# Patient Record
Sex: FEMALE | Race: WHITE | NOT HISPANIC OR LATINO
[De-identification: names, ages, dates, MRNs, and addresses within clinical notes are randomized per-mention and may not be internally consistent; named-entity substitution may affect disease eponyms.]

---

## 2017-01-06 ENCOUNTER — OTHER (OUTPATIENT)
Age: 67
End: 2017-01-06

## 2018-12-06 ENCOUNTER — APPOINTMENT (OUTPATIENT)
Dept: NEUROSURGERY | Facility: CLINIC | Age: 68
End: 2018-12-06
Payer: MEDICARE

## 2018-12-06 VITALS
TEMPERATURE: 98.5 F | WEIGHT: 203 LBS | DIASTOLIC BLOOD PRESSURE: 83 MMHG | SYSTOLIC BLOOD PRESSURE: 139 MMHG | HEART RATE: 76 BPM | BODY MASS INDEX: 32.62 KG/M2 | HEIGHT: 66 IN | OXYGEN SATURATION: 95 %

## 2018-12-06 DIAGNOSIS — Z78.9 OTHER SPECIFIED HEALTH STATUS: ICD-10-CM

## 2018-12-06 DIAGNOSIS — R26.81 UNSTEADINESS ON FEET: ICD-10-CM

## 2018-12-06 PROCEDURE — 99204 OFFICE O/P NEW MOD 45 MIN: CPT

## 2018-12-10 PROBLEM — R26.81 GAIT INSTABILITY: Status: ACTIVE | Noted: 2018-12-06

## 2018-12-10 RX ORDER — MULTIVIT-MIN/IRON/FOLIC ACID/K 18-400-25
TABLET ORAL
Refills: 0 | Status: ACTIVE | COMMUNITY

## 2018-12-29 ENCOUNTER — TRANSCRIPTION ENCOUNTER (OUTPATIENT)
Age: 68
End: 2018-12-29

## 2019-01-03 ENCOUNTER — APPOINTMENT (OUTPATIENT)
Dept: NEUROSURGERY | Facility: CLINIC | Age: 69
End: 2019-01-03
Payer: MEDICARE

## 2019-01-03 VITALS
HEART RATE: 77 BPM | BODY MASS INDEX: 32.62 KG/M2 | HEIGHT: 66 IN | OXYGEN SATURATION: 96 % | SYSTOLIC BLOOD PRESSURE: 139 MMHG | DIASTOLIC BLOOD PRESSURE: 80 MMHG | TEMPERATURE: 98.9 F | WEIGHT: 203 LBS

## 2019-01-03 PROCEDURE — 99213 OFFICE O/P EST LOW 20 MIN: CPT

## 2019-01-31 ENCOUNTER — APPOINTMENT (OUTPATIENT)
Dept: NEUROSURGERY | Facility: CLINIC | Age: 69
End: 2019-01-31
Payer: MEDICARE

## 2019-01-31 VITALS
BODY MASS INDEX: 32.62 KG/M2 | HEART RATE: 80 BPM | SYSTOLIC BLOOD PRESSURE: 136 MMHG | HEIGHT: 66 IN | TEMPERATURE: 99.7 F | WEIGHT: 203 LBS | DIASTOLIC BLOOD PRESSURE: 77 MMHG

## 2019-01-31 PROCEDURE — 99215 OFFICE O/P EST HI 40 MIN: CPT

## 2019-02-07 NOTE — REASON FOR VISIT
[Follow-Up: _____] : a [unfilled] follow-up visit [Spouse] : spouse [FreeTextEntry1] : Ms. Chacon presents for follow up visit and review of EMG results.  She continues to report severe lower back pain with radiation to RLE which started around 2/2018. She does not recall any injury or trauma at that time. No LLE pain. LBP > RLE pain. Pain intensity 8/10. 10/10 at its worse. Denies any saddle anesthesia, urinary or bowel dysfunction. She is ambulating independently but has intermittent bouts of unsteadiness. She has noticed progressive worsening of gait over the last few months. No falls. She states symptoms are aggravated by activities in general. Nothing in particular improves her pain. She is currently managing her pain with gabapentin, Valium and oxycodone prescribed by neurology. She has not had any recent physical therapy or pain management.\par  \par

## 2019-02-07 NOTE — ASSESSMENT
[FreeTextEntry1] : Ms. Alegre presents for follow-up and review of EMG.  There is no electrophysiologic evidence of radiculopathy.  However, the patient has persistent symptoms despite conservative measures.  Overall, she has 80% LBP and 20 percent LE pain.  Of the LE pain, 80% is on the right and 20% is on the left.  I have explained the risks, benefits, and alternatives of lumbar laminectomy and extension of fusion as follows:\par benefit: hopeful improvement in pain, hopeful prevention of progression of symptoms\par alternatives: no surgical intervention; continued conservative measures (PT, pain management)\par risks: bleeding, infection, CSF leak, failure of procedure or instrumentation, adjacent level degeneration, pseudoarthrosis, need for re-operation, DVT, PE, MI, PNA, UTI, difficulty/failure to intubate or extubate, seizure, stroke, coma, death, worsening pain, new or worsening numbness, tingling, weakness, paralysis, sensory changes, difficulty/inability to walk, sexual dysfunction, incontinence\par I called the patient by telephone after further review of imaging and discussion of the case.  Ms. Alegre plans to retry PT x 4-6 weeks.  She will likely opt to schedule surgical intervention should she have persistent or worsening symptoms despite another course of PT, and I would then plan to schedule her accordingly.

## 2019-02-07 NOTE — PHYSICAL EXAM
[General Appearance - Alert] : alert [General Appearance - In No Acute Distress] : in no acute distress [General Appearance - Well Nourished] : well nourished [General Appearance - Well Developed] : well developed [General Appearance - Well-Appearing] : healthy appearing [Longitudinal] : longitudinal [Clean] : clean [Dry] : dry [Well-Healed] : well-healed [Intact] : intact [No Drainage] : without drainage [Normal Skin] : normal [Erythema] : not erythematous [Warm] : not warm [Fluctuant] : not fluctuant [Oriented To Time, Place, And Person] : oriented to person, place, and time [Person] : oriented to person [Place] : oriented to place [Time] : oriented to time [Short Term Intact] : short term memory intact [Concentration Intact] : normal concentrating ability [Fluency] : fluency intact [Cranial Nerves Optic (II)] : visual acuity intact bilaterally,  pupils equal round and reactive to light [Cranial Nerves Oculomotor (III)] : extraocular motion intact [Cranial Nerves Facial (VII)] : face symmetrical [Cranial Nerves Hypoglossal (XII)] : there was no tongue deviation with protrusion [Motor Tone] : muscle tone was normal in all four extremities [Motor Strength] : muscle strength was normal in all four extremities [Sensation Tactile Decrease] : light touch was intact [FreeTextEntry6] : LE 5/5 bilaterally [FreeTextEntry8] : antalgic gait

## 2019-04-09 ENCOUNTER — TRANSCRIPTION ENCOUNTER (OUTPATIENT)
Age: 69
End: 2019-04-09

## 2019-04-15 ENCOUNTER — APPOINTMENT (OUTPATIENT)
Dept: NEUROSURGERY | Facility: CLINIC | Age: 69
End: 2019-04-15
Payer: MEDICARE

## 2019-04-15 VITALS
WEIGHT: 200 LBS | SYSTOLIC BLOOD PRESSURE: 138 MMHG | BODY MASS INDEX: 32.14 KG/M2 | HEART RATE: 74 BPM | DIASTOLIC BLOOD PRESSURE: 77 MMHG | HEIGHT: 66 IN | TEMPERATURE: 98.9 F

## 2019-04-15 PROCEDURE — 99215 OFFICE O/P EST HI 40 MIN: CPT | Mod: 57

## 2019-04-18 NOTE — REVIEW OF SYSTEMS
[As Noted in HPI] : as noted in HPI [Negative] : Heme/Lymph [FreeTextEntry3] : periorbital ecchymosis s/p fall

## 2019-04-18 NOTE — PHYSICAL EXAM
[General Appearance - Alert] : alert [General Appearance - In No Acute Distress] : in no acute distress [General Appearance - Well Nourished] : well nourished [General Appearance - Well Developed] : well developed [General Appearance - Well-Appearing] : healthy appearing [] : normal voice and communication [Oriented To Time, Place, And Person] : oriented to person, place, and time [Affect] : the affect was normal [Impaired Insight] : insight and judgment were intact [Mood] : the mood was normal [Memory Recent] : recent memory was not impaired [Memory Remote] : remote memory was not impaired [Person] : oriented to person [Place] : oriented to place [Time] : oriented to time [Concentration Intact] : normal concentrating ability [Fluency] : fluency intact [Comprehension] : comprehension intact [Cranial Nerves Trigeminal (V)] : facial sensation intact symmetrically [Cranial Nerves Oculomotor (III)] : extraocular motion intact [Cranial Nerves Glossopharyngeal (IX)] : tongue and palate midline [Cranial Nerves Accessory (XI - Cranial And Spinal)] : head turning and shoulder shrug symmetric [Cranial Nerves Hypoglossal (XII)] : there was no tongue deviation with protrusion [Motor Tone] : muscle tone was normal in all four extremities [Balance] : balance was intact [Over the Past 2 Weeks, Have You Felt Down, Depressed, or Hopeless?] : 1.) Over the past 2 weeks, have you felt down, depressed, or hopeless? No [Over the Past 2 Weeks, Have You Felt Little Interest or Pleasure Doing Things?] : 2.) Over the past 2 weeks, have you felt little interest or pleasure doing things? No [Cranial Nerves Optic (II)] : visual acuity intact bilaterally,  pupils equal round and reactive to light [FreeTextEntry8] : antalgic gait [FreeTextEntry6] : LLE 5/5 with encouragement\par RLE 4-4+/5 with encouragement/limited secondary to pain

## 2019-04-18 NOTE — REASON FOR VISIT
[Follow-Up: _____] : a [unfilled] follow-up visit [Spouse] : spouse [FreeTextEntry1] : Ms. Alegre presents for follow-up and discussion of surgical intervention.  She has continued complaint of back greater than LE pain.  Of the lower extremity discomfort, the pain is worse on the right than the left.  The pain primarily affects the lateral aspect of the thigh to the knee.  It does also travel below the knee and is associated with numbness in the foot.  At today's visit, the patient endorses 2 months of difficulty with urination, which she states she did no mention during previous visits.  The patient sustained a fall recently secondary to her right leg giving out from pain.  She states that she fell into a fall but denies LOC, headaches, nausea, vomiting, or seizure.  She presents with ecchymosis under the eyes bilaterally at the time of today's visit.

## 2019-04-18 NOTE — ASSESSMENT
[FreeTextEntry1] : Ms. Alegre presents with her   for follow-up and discussion of surgical intervention.  She has an interval history as above and has failed conservative measures (PT and pain management).  I have explained to the patient that surgery is unlikely to significantly improve her chronic back pain.  She understands that the hopeful goal of surgery is relief of leg pain/radiculopathy.  I have explained the risks, benefits, and alternatives of surgical intervention to the patient and her  as below:\par benefit: hopeful decompression, hopeful pain relief/improvement in symptoms, hopeful prevention of progression of deficit\par alternative: no surgical intervention, continued conservative measures (PT, pain management)\par risks: bleeding, infection, CSF leak, failure of procedure or instrumentation, pseudoarthrosis, adjacent level degeneration, need for re-operation, seizure, stroke, coma, death, DVT, PE, MI, PNA, UTI, difficulty/failure to intubate or extubate, new or worsening numbness, tingling, weakness, paralysis, sensory changes, difficulty/inability to ambulate, sexual dysfunction, incontinence\par The patient and her  verbalize their understanding of the above.  I have answered all their questions.  Ms. Alegre wishes to proceed with surgical intervention.  She is pending a planned vacation and does not wish to proceed with surgical intervention on 4/22.  I will work to schedule her for Wednesday, 5/8.  The patient will, of course, require medical optimization.  Ms. Alegre and her  know to call the office with any new or concerning symptoms in the interim.

## 2019-04-29 ENCOUNTER — OUTPATIENT (OUTPATIENT)
Dept: OUTPATIENT SERVICES | Facility: HOSPITAL | Age: 69
LOS: 1 days | End: 2019-04-29
Payer: MEDICARE

## 2019-04-29 VITALS
TEMPERATURE: 99 F | WEIGHT: 200.62 LBS | HEIGHT: 65 IN | RESPIRATION RATE: 16 BRPM | HEART RATE: 72 BPM | DIASTOLIC BLOOD PRESSURE: 91 MMHG | SYSTOLIC BLOOD PRESSURE: 143 MMHG

## 2019-04-29 DIAGNOSIS — Z90.49 ACQUIRED ABSENCE OF OTHER SPECIFIED PARTS OF DIGESTIVE TRACT: Chronic | ICD-10-CM

## 2019-04-29 DIAGNOSIS — Z29.9 ENCOUNTER FOR PROPHYLACTIC MEASURES, UNSPECIFIED: ICD-10-CM

## 2019-04-29 DIAGNOSIS — Z98.89 OTHER SPECIFIED POSTPROCEDURAL STATES: Chronic | ICD-10-CM

## 2019-04-29 DIAGNOSIS — M48.061 SPINAL STENOSIS, LUMBAR REGION WITHOUT NEUROGENIC CLAUDICATION: ICD-10-CM

## 2019-04-29 DIAGNOSIS — Z01.818 ENCOUNTER FOR OTHER PREPROCEDURAL EXAMINATION: ICD-10-CM

## 2019-04-29 LAB
ALBUMIN SERPL ELPH-MCNC: 4.4 G/DL — SIGNIFICANT CHANGE UP (ref 3.3–5.2)
ALP SERPL-CCNC: 95 U/L — SIGNIFICANT CHANGE UP (ref 40–120)
ALT FLD-CCNC: 25 U/L — SIGNIFICANT CHANGE UP
ANION GAP SERPL CALC-SCNC: 15 MMOL/L — SIGNIFICANT CHANGE UP (ref 5–17)
APTT BLD: 29 SEC — SIGNIFICANT CHANGE UP (ref 27.5–36.3)
AST SERPL-CCNC: 27 U/L — SIGNIFICANT CHANGE UP
BASOPHILS # BLD AUTO: 0 K/UL — SIGNIFICANT CHANGE UP (ref 0–0.2)
BASOPHILS NFR BLD AUTO: 0.5 % — SIGNIFICANT CHANGE UP (ref 0–2)
BILIRUB SERPL-MCNC: 0.3 MG/DL — LOW (ref 0.4–2)
BLD GP AB SCN SERPL QL: SIGNIFICANT CHANGE UP
BUN SERPL-MCNC: 6 MG/DL — LOW (ref 8–20)
CALCIUM SERPL-MCNC: 9.1 MG/DL — SIGNIFICANT CHANGE UP (ref 8.6–10.2)
CHLORIDE SERPL-SCNC: 110 MMOL/L — HIGH (ref 98–107)
CO2 SERPL-SCNC: 22 MMOL/L — SIGNIFICANT CHANGE UP (ref 22–29)
CREAT SERPL-MCNC: 0.71 MG/DL — SIGNIFICANT CHANGE UP (ref 0.5–1.3)
EOSINOPHIL # BLD AUTO: 0.2 K/UL — SIGNIFICANT CHANGE UP (ref 0–0.5)
EOSINOPHIL NFR BLD AUTO: 1.7 % — SIGNIFICANT CHANGE UP (ref 0–6)
GLUCOSE SERPL-MCNC: 110 MG/DL — SIGNIFICANT CHANGE UP (ref 70–115)
HBA1C BLD-MCNC: 6 % — HIGH (ref 4–5.6)
HCT VFR BLD CALC: 45.6 % — SIGNIFICANT CHANGE UP (ref 37–47)
HGB BLD-MCNC: 14.8 G/DL — SIGNIFICANT CHANGE UP (ref 12–16)
INR BLD: 0.9 RATIO — SIGNIFICANT CHANGE UP (ref 0.88–1.16)
LYMPHOCYTES # BLD AUTO: 3.5 K/UL — SIGNIFICANT CHANGE UP (ref 1–4.8)
LYMPHOCYTES # BLD AUTO: 39.4 % — SIGNIFICANT CHANGE UP (ref 20–55)
MCHC RBC-ENTMCNC: 28.1 PG — SIGNIFICANT CHANGE UP (ref 27–31)
MCHC RBC-ENTMCNC: 32.5 G/DL — SIGNIFICANT CHANGE UP (ref 32–36)
MCV RBC AUTO: 86.7 FL — SIGNIFICANT CHANGE UP (ref 81–99)
MONOCYTES # BLD AUTO: 0.5 K/UL — SIGNIFICANT CHANGE UP (ref 0–0.8)
MONOCYTES NFR BLD AUTO: 5.9 % — SIGNIFICANT CHANGE UP (ref 3–10)
MRSA PCR RESULT.: SIGNIFICANT CHANGE UP
NEUTROPHILS # BLD AUTO: 4.6 K/UL — SIGNIFICANT CHANGE UP (ref 1.8–8)
NEUTROPHILS NFR BLD AUTO: 52.4 % — SIGNIFICANT CHANGE UP (ref 37–73)
PLATELET # BLD AUTO: 261 K/UL — SIGNIFICANT CHANGE UP (ref 150–400)
POTASSIUM SERPL-MCNC: 3.7 MMOL/L — SIGNIFICANT CHANGE UP (ref 3.5–5.3)
POTASSIUM SERPL-SCNC: 3.7 MMOL/L — SIGNIFICANT CHANGE UP (ref 3.5–5.3)
PROT SERPL-MCNC: 7.3 G/DL — SIGNIFICANT CHANGE UP (ref 6.6–8.7)
PROTHROM AB SERPL-ACNC: 10.3 SEC — SIGNIFICANT CHANGE UP (ref 10–12.9)
RBC # BLD: 5.26 M/UL — HIGH (ref 4.4–5.2)
RBC # FLD: 14.5 % — SIGNIFICANT CHANGE UP (ref 11–15.6)
S AUREUS DNA NOSE QL NAA+PROBE: SIGNIFICANT CHANGE UP
SODIUM SERPL-SCNC: 147 MMOL/L — HIGH (ref 135–145)
TYPE + AB SCN PNL BLD: SIGNIFICANT CHANGE UP
WBC # BLD: 8.8 K/UL — SIGNIFICANT CHANGE UP (ref 4.8–10.8)
WBC # FLD AUTO: 8.8 K/UL — SIGNIFICANT CHANGE UP (ref 4.8–10.8)

## 2019-04-29 PROCEDURE — 86901 BLOOD TYPING SEROLOGIC RH(D): CPT

## 2019-04-29 PROCEDURE — 85027 COMPLETE CBC AUTOMATED: CPT

## 2019-04-29 PROCEDURE — G0463: CPT

## 2019-04-29 PROCEDURE — 87641 MR-STAPH DNA AMP PROBE: CPT

## 2019-04-29 PROCEDURE — 85610 PROTHROMBIN TIME: CPT

## 2019-04-29 PROCEDURE — 85730 THROMBOPLASTIN TIME PARTIAL: CPT

## 2019-04-29 PROCEDURE — 36415 COLL VENOUS BLD VENIPUNCTURE: CPT

## 2019-04-29 PROCEDURE — 87640 STAPH A DNA AMP PROBE: CPT

## 2019-04-29 PROCEDURE — 93010 ELECTROCARDIOGRAM REPORT: CPT

## 2019-04-29 PROCEDURE — 86900 BLOOD TYPING SEROLOGIC ABO: CPT

## 2019-04-29 PROCEDURE — 86850 RBC ANTIBODY SCREEN: CPT

## 2019-04-29 PROCEDURE — 83036 HEMOGLOBIN GLYCOSYLATED A1C: CPT

## 2019-04-29 PROCEDURE — 93005 ELECTROCARDIOGRAM TRACING: CPT

## 2019-04-29 PROCEDURE — 80053 COMPREHEN METABOLIC PANEL: CPT

## 2019-04-29 RX ORDER — SODIUM CHLORIDE 9 MG/ML
3 INJECTION INTRAMUSCULAR; INTRAVENOUS; SUBCUTANEOUS EVERY 8 HOURS
Qty: 0 | Refills: 0 | Status: DISCONTINUED | OUTPATIENT
Start: 2019-05-08 | End: 2019-05-08

## 2019-04-29 NOTE — H&P PST ADULT - NSICDXPASTSURGICALHX_GEN_ALL_CORE_FT
PAST SURGICAL HISTORY:  S/P cholecystectomy     S/P colon resection     S/P lumbar fusion 2000's,  cervical discectomy 1980's,  rotator cuff repair 1980's,  ovarian cystectomy 1980's,  arthroscopy right knee x2, left once in the 2000's,  left lumpectomy 1980's (benign)    S/P tonsillectomy 1970's

## 2019-04-29 NOTE — H&P PST ADULT - NSANTHOSAYNRD_GEN_A_CORE
No. ALLY screening performed.  STOP BANG Legend: 0-2 = LOW Risk; 3-4 = INTERMEDIATE Risk; 5-8 = HIGH Risk

## 2019-04-29 NOTE — H&P PST ADULT - NSICDXPASTMEDICALHX_GEN_ALL_CORE_FT
PAST MEDICAL HISTORY:  Chronic pain     Colon cancer     GERD (gastroesophageal reflux disease)     Hyperlipidemia     Hypothyroid     IBS (irritable bowel syndrome)     Mariola-Cai tear repaired 5/14/15    Migraine     Raynaud disease

## 2019-04-29 NOTE — H&P PST ADULT - NSICDXPROBLEM_GEN_ALL_CORE_FT
PROBLEM DIAGNOSES  Problem: Lumbar stenosis  Assessment and Plan: L2-3, L3-4 laminectomy and fusion.  Preop medical eval    Problem: Need for prophylactic measure  Assessment and Plan: Caprini score 4, mod VTE risk, SCDs ordered, surgical team to assess need for pharm proph.

## 2019-04-29 NOTE — PATIENT PROFILE ADULT - NSPROEDALEARNPREF_GEN_A_NUR
verbal instruction/individual instruction/written material/Pre op teaching surgical scrub pain management instructions given to pt

## 2019-04-29 NOTE — H&P PST ADULT - ASSESSMENT
68 yo female with L2-3, L3-4 stenosis.    CAPRINI VTE 2.0 SCORE [CLOT updated 2019]    AGE RELATED RISK FACTORS                                                       MOBILITY RELATED FACTORS  [ ] Age 41-60 years                                            (1 Point)                    [ ] Bed rest                                                        (1 Point)  [ x ] Age: 61-74 years                                           (2 Points)                  [ ] Plaster cast                                                   (2 Points)  [ ] Age= 75 years                                              (3 Points)                    [ ] Bed bound for more than 72 hours                 (2 Points)    DISEASE RELATED RISK FACTORS                                               GENDER SPECIFIC FACTORS  [ ] Edema in the lower extremities                       (1 Point)              [ ] Pregnancy                                                     (1 Point)  [ ] Varicose veins                                               (1 Point)                     [ ] Post-partum < 6 weeks                                   (1 Point)             [ x ] BMI > 25 Kg/m2                                            (1 Point)                     [ ] Hormonal therapy  or oral contraception          (1 Point)                 [ ] Sepsis (in the previous month)                        (1 Point)               [ ] History of pregnancy complications                 (1 point)  [ ] Pneumonia or serious lung disease                                               [ ] Unexplained or recurrent                     (1 Point)           (in the previous month)                               (1 Point)  [ ] Abnormal pulmonary function test                     (1 Point)                 SURGERY RELATED RISK FACTORS  [ ] Acute myocardial infarction                              (1 Point)               [ ]  Section                                             (1 Point)  [ ] Congestive heart failure (in the previous month)  (1 Point)      [ ] Minor surgery                                                  (1 Point)   [ ] Inflammatory bowel disease                             (1 Point)               [ ] Arthroscopic surgery                                        (2 Points)  [ ] Central venous access                                      (2 Points)                [ x ] General surgery lasting more than 45 minutes (2 points)  [ ] Malignancy- Present or previous                   (2 Points)                [ ] Elective arthroplasty                                         (5 points)    [ ] Stroke (in the previous month)                          (5 Points)                                                                                                                                                           HEMATOLOGY RELATED FACTORS                                                 TRAUMA RELATED RISK FACTORS  [ ] Prior episodes of VTE                                     (3 Points)                [ ] Fracture of the hip, pelvis, or leg                       (5 Points)  [ ] Positive family history for VTE                         (3 Points)             [ ] Acute spinal cord injury (in the previous month)  (5 Points)  [ ] Prothrombin 84264 A                                     (3 Points)               [ ] Paralysis  (less than 1 month)                             (5 Points)  [ ] Factor V Leiden                                             (3 Points)                  [ ] Multiple Trauma within 1 month                        (5 Points)  [ ] Lupus anticoagulants                                     (3 Points)                                                           [ ] Anticardiolipin antibodies                               (3 Points)                                                       [ ] High homocysteine in the blood                      (3 Points)                                             [ ] Other congenital or acquired thrombophilia      (3 Points)                                                [ ] Heparin induced thrombocytopenia                  (3 Points)                                     Total Score [    4     ]    OPIOID RISK TOOL    LIBRADO EACH BOX THAT APPLIES AND ADD TOTALS AT THE END    FAMILY HISTORY OF SUBSTANCE ABUSE                 FEMALE         MALE                                                Alcohol                             [  ]1 pt          [  ]3pts                                               Illegal Drugs                     [  ]2 pts        [  ]3pts                                               Rx Drugs                           [  ]4 pts        [  ]4 pts    PERSONAL HISTORY OF SUBSTANCE ABUSE                                                                                          Alcohol                             [  ]3 pts       [  ]3 pts                                               Illegal Drugs                     [  ]4 pts        [  ]4 pts                                               Rx Drugs                           [  ]5 pts        [  ]5 pts    AGE BETWEEN 16-45 YEARS                                      [  ]1 pt         [  ]1 pt    HISTORY OF PREADOLESCENT   SEXUAL ABUSE                                                             [  ]3 pts        [  ]0pts    PSYCHOLOGICAL DISEASE                     ADD, OCD, Bipolar, Schizophrenia        [  ]2 pts         [  ]2 pts                      Depression                                               [ x ]1 pt           [  ]1 pt           SCORING TOTAL   (add numbers and type here)              (*1*)                                     A score of 3 or lower indicated LOW risk for future opioid abuse  A score of 4 to 7 indicated moderate risk for future opioid abuse  A score of 8 or higher indicates a high risk for opioid abuse

## 2019-05-07 ENCOUNTER — TRANSCRIPTION ENCOUNTER (OUTPATIENT)
Age: 69
End: 2019-05-07

## 2019-05-08 ENCOUNTER — APPOINTMENT (OUTPATIENT)
Dept: NEUROSURGERY | Facility: HOSPITAL | Age: 69
End: 2019-05-08
Payer: MEDICARE

## 2019-05-08 ENCOUNTER — INPATIENT (INPATIENT)
Facility: HOSPITAL | Age: 69
LOS: 5 days | Discharge: HOME | DRG: 460 | End: 2019-05-14
Attending: NEUROLOGICAL SURGERY | Admitting: NEUROLOGICAL SURGERY
Payer: MEDICARE

## 2019-05-08 VITALS
TEMPERATURE: 97 F | OXYGEN SATURATION: 100 % | DIASTOLIC BLOOD PRESSURE: 84 MMHG | HEIGHT: 65 IN | WEIGHT: 200.62 LBS | RESPIRATION RATE: 16 BRPM | HEART RATE: 79 BPM | SYSTOLIC BLOOD PRESSURE: 146 MMHG

## 2019-05-08 DIAGNOSIS — M48.061 SPINAL STENOSIS, LUMBAR REGION WITHOUT NEUROGENIC CLAUDICATION: ICD-10-CM

## 2019-05-08 DIAGNOSIS — Z90.49 ACQUIRED ABSENCE OF OTHER SPECIFIED PARTS OF DIGESTIVE TRACT: Chronic | ICD-10-CM

## 2019-05-08 DIAGNOSIS — M47.816 SPONDYLOSIS WITHOUT MYELOPATHY OR RADICULOPATHY, LUMBAR REGION: ICD-10-CM

## 2019-05-08 DIAGNOSIS — Z98.89 OTHER SPECIFIED POSTPROCEDURAL STATES: Chronic | ICD-10-CM

## 2019-05-08 LAB
GRAM STN FLD: SIGNIFICANT CHANGE UP
GRAM STN FLD: SIGNIFICANT CHANGE UP
SPECIMEN SOURCE: SIGNIFICANT CHANGE UP
SPECIMEN SOURCE: SIGNIFICANT CHANGE UP

## 2019-05-08 PROCEDURE — 63048 LAM FACETEC &FORAMOT EA ADDL: CPT | Mod: 62

## 2019-05-08 PROCEDURE — 22830 EXPLORATION OF SPINAL FUSION: CPT | Mod: 62,59

## 2019-05-08 PROCEDURE — 63047 LAM FACETEC & FORAMOT LUMBAR: CPT | Mod: 62

## 2019-05-08 PROCEDURE — 22614 ARTHRD PST TQ 1NTRSPC EA ADD: CPT | Mod: 62

## 2019-05-08 PROCEDURE — 63048 LAM FACETEC &FORAMOT EA ADDL: CPT

## 2019-05-08 PROCEDURE — 22612 ARTHRD PST TQ 1NTRSPC LUMBAR: CPT | Mod: 62

## 2019-05-08 PROCEDURE — 63042 LAMINOTOMY SINGLE LUMBAR: CPT | Mod: 59,62

## 2019-05-08 PROCEDURE — 22842 INSERT SPINE FIXATION DEVICE: CPT

## 2019-05-08 PROCEDURE — 22842 INSERT SPINE FIXATION DEVICE: CPT | Mod: 62

## 2019-05-08 RX ORDER — RALOXIFENE HYDROCHLORIDE 60 MG/1
60 TABLET, COATED ORAL DAILY
Qty: 0 | Refills: 0 | Status: DISCONTINUED | OUTPATIENT
Start: 2019-05-08 | End: 2019-05-14

## 2019-05-08 RX ORDER — VENLAFAXINE HCL 75 MG
225 CAPSULE, EXT RELEASE 24 HR ORAL DAILY
Qty: 0 | Refills: 0 | Status: DISCONTINUED | OUTPATIENT
Start: 2019-05-08 | End: 2019-05-08

## 2019-05-08 RX ORDER — FAMOTIDINE 10 MG/ML
1 INJECTION INTRAVENOUS
Qty: 0 | Refills: 0 | COMMUNITY

## 2019-05-08 RX ORDER — VANCOMYCIN HCL 1 G
1500 VIAL (EA) INTRAVENOUS ONCE
Qty: 0 | Refills: 0 | Status: COMPLETED | OUTPATIENT
Start: 2019-05-08 | End: 2019-05-08

## 2019-05-08 RX ORDER — LIDOCAINE 4 G/100G
1 CREAM TOPICAL
Qty: 0 | Refills: 0 | COMMUNITY

## 2019-05-08 RX ORDER — ACETAMINOPHEN 500 MG
650 TABLET ORAL EVERY 6 HOURS
Qty: 0 | Refills: 0 | Status: DISCONTINUED | OUTPATIENT
Start: 2019-05-08 | End: 2019-05-11

## 2019-05-08 RX ORDER — GABAPENTIN 400 MG/1
0 CAPSULE ORAL
Qty: 0 | Refills: 0 | COMMUNITY

## 2019-05-08 RX ORDER — RALOXIFENE HYDROCHLORIDE 60 MG/1
1 TABLET, COATED ORAL
Qty: 0 | Refills: 0 | COMMUNITY

## 2019-05-08 RX ORDER — VERAPAMIL HCL 240 MG
240 CAPSULE, EXTENDED RELEASE PELLETS 24 HR ORAL DAILY
Qty: 0 | Refills: 0 | Status: DISCONTINUED | OUTPATIENT
Start: 2019-05-08 | End: 2019-05-14

## 2019-05-08 RX ORDER — SODIUM CHLORIDE 9 MG/ML
3 INJECTION INTRAMUSCULAR; INTRAVENOUS; SUBCUTANEOUS EVERY 8 HOURS
Qty: 0 | Refills: 0 | Status: DISCONTINUED | OUTPATIENT
Start: 2019-05-08 | End: 2019-05-14

## 2019-05-08 RX ORDER — ONDANSETRON 8 MG/1
4 TABLET, FILM COATED ORAL EVERY 6 HOURS
Qty: 0 | Refills: 0 | Status: DISCONTINUED | OUTPATIENT
Start: 2019-05-08 | End: 2019-05-12

## 2019-05-08 RX ORDER — ZOLMITRIPTAN 5 MG/1
5 TABLET ORAL DAILY
Qty: 0 | Refills: 0 | Status: DISCONTINUED | OUTPATIENT
Start: 2019-05-08 | End: 2019-05-14

## 2019-05-08 RX ORDER — WHEAT DEXTRIN 3 G/4 G
0 POWDER IN PACKET (EA) ORAL
Qty: 0 | Refills: 0 | COMMUNITY

## 2019-05-08 RX ORDER — SENNA PLUS 8.6 MG/1
2 TABLET ORAL AT BEDTIME
Qty: 0 | Refills: 0 | Status: DISCONTINUED | OUTPATIENT
Start: 2019-05-08 | End: 2019-05-12

## 2019-05-08 RX ORDER — FAMOTIDINE 10 MG/ML
20 INJECTION INTRAVENOUS
Qty: 0 | Refills: 0 | Status: DISCONTINUED | OUTPATIENT
Start: 2019-05-08 | End: 2019-05-14

## 2019-05-08 RX ORDER — ATORVASTATIN CALCIUM 80 MG/1
20 TABLET, FILM COATED ORAL AT BEDTIME
Qty: 0 | Refills: 0 | Status: DISCONTINUED | OUTPATIENT
Start: 2019-05-08 | End: 2019-05-14

## 2019-05-08 RX ORDER — FENTANYL CITRATE 50 UG/ML
50 INJECTION INTRAVENOUS
Qty: 0 | Refills: 0 | Status: DISCONTINUED | OUTPATIENT
Start: 2019-05-08 | End: 2019-05-08

## 2019-05-08 RX ORDER — CYCLOBENZAPRINE HYDROCHLORIDE 10 MG/1
10 TABLET, FILM COATED ORAL EVERY 8 HOURS
Qty: 0 | Refills: 0 | Status: DISCONTINUED | OUTPATIENT
Start: 2019-05-08 | End: 2019-05-11

## 2019-05-08 RX ORDER — ONDANSETRON 8 MG/1
4 TABLET, FILM COATED ORAL EVERY 4 HOURS
Qty: 0 | Refills: 0 | Status: DISCONTINUED | OUTPATIENT
Start: 2019-05-08 | End: 2019-05-14

## 2019-05-08 RX ORDER — NALOXONE HYDROCHLORIDE 4 MG/.1ML
0.1 SPRAY NASAL
Qty: 0 | Refills: 0 | Status: DISCONTINUED | OUTPATIENT
Start: 2019-05-08 | End: 2019-05-12

## 2019-05-08 RX ORDER — GABAPENTIN 400 MG/1
600 CAPSULE ORAL THREE TIMES A DAY
Qty: 0 | Refills: 0 | Status: DISCONTINUED | OUTPATIENT
Start: 2019-05-08 | End: 2019-05-14

## 2019-05-08 RX ORDER — HYDROMORPHONE HYDROCHLORIDE 2 MG/ML
30 INJECTION INTRAMUSCULAR; INTRAVENOUS; SUBCUTANEOUS
Qty: 0 | Refills: 0 | Status: DISCONTINUED | OUTPATIENT
Start: 2019-05-08 | End: 2019-05-10

## 2019-05-08 RX ORDER — ZOLMITRIPTAN 5 MG/1
1 TABLET ORAL
Qty: 0 | Refills: 0 | COMMUNITY

## 2019-05-08 RX ORDER — SODIUM CHLORIDE 9 MG/ML
1000 INJECTION INTRAMUSCULAR; INTRAVENOUS; SUBCUTANEOUS
Qty: 0 | Refills: 0 | Status: DISCONTINUED | OUTPATIENT
Start: 2019-05-08 | End: 2019-05-09

## 2019-05-08 RX ORDER — VENLAFAXINE HCL 75 MG
25 CAPSULE, EXT RELEASE 24 HR ORAL DAILY
Qty: 0 | Refills: 0 | Status: DISCONTINUED | OUTPATIENT
Start: 2019-05-08 | End: 2019-05-08

## 2019-05-08 RX ORDER — TOPIRAMATE 25 MG
50 TABLET ORAL
Qty: 0 | Refills: 0 | Status: DISCONTINUED | OUTPATIENT
Start: 2019-05-08 | End: 2019-05-14

## 2019-05-08 RX ORDER — FLUCONAZOLE 150 MG/1
1 TABLET ORAL
Qty: 0 | Refills: 0 | COMMUNITY

## 2019-05-08 RX ORDER — DIAZEPAM 5 MG
5 TABLET ORAL
Qty: 0 | Refills: 0 | Status: DISCONTINUED | OUTPATIENT
Start: 2019-05-08 | End: 2019-05-09

## 2019-05-08 RX ORDER — CHOLECALCIFEROL (VITAMIN D3) 125 MCG
2000 CAPSULE ORAL DAILY
Qty: 0 | Refills: 0 | Status: DISCONTINUED | OUTPATIENT
Start: 2019-05-08 | End: 2019-05-14

## 2019-05-08 RX ORDER — LIDOCAINE 4 G/100G
1 CREAM TOPICAL DAILY
Qty: 0 | Refills: 0 | Status: DISCONTINUED | OUTPATIENT
Start: 2019-05-08 | End: 2019-05-14

## 2019-05-08 RX ORDER — SODIUM CHLORIDE 9 MG/ML
1000 INJECTION, SOLUTION INTRAVENOUS
Qty: 0 | Refills: 0 | Status: DISCONTINUED | OUTPATIENT
Start: 2019-05-08 | End: 2019-05-08

## 2019-05-08 RX ORDER — LEVOTHYROXINE SODIUM 125 MCG
1 TABLET ORAL
Qty: 0 | Refills: 0 | COMMUNITY

## 2019-05-08 RX ORDER — CEFAZOLIN SODIUM 1 G
2000 VIAL (EA) INJECTION ONCE
Qty: 0 | Refills: 0 | Status: DISCONTINUED | OUTPATIENT
Start: 2019-05-08 | End: 2019-05-08

## 2019-05-08 RX ORDER — POLYETHYLENE GLYCOL 3350 17 G/17G
17 POWDER, FOR SOLUTION ORAL
Qty: 0 | Refills: 0 | COMMUNITY

## 2019-05-08 RX ORDER — GABAPENTIN 400 MG/1
600 CAPSULE ORAL AT BEDTIME
Qty: 0 | Refills: 0 | Status: DISCONTINUED | OUTPATIENT
Start: 2019-05-08 | End: 2019-05-08

## 2019-05-08 RX ORDER — VERAPAMIL HCL 240 MG
240 CAPSULE, EXTENDED RELEASE PELLETS 24 HR ORAL DAILY
Qty: 0 | Refills: 0 | Status: DISCONTINUED | OUTPATIENT
Start: 2019-05-08 | End: 2019-05-08

## 2019-05-08 RX ORDER — MAGNESIUM HYDROXIDE 400 MG/1
30 TABLET, CHEWABLE ORAL EVERY 12 HOURS
Qty: 0 | Refills: 0 | Status: DISCONTINUED | OUTPATIENT
Start: 2019-05-08 | End: 2019-05-12

## 2019-05-08 RX ORDER — FLUCONAZOLE 150 MG/1
100 TABLET ORAL DAILY
Qty: 0 | Refills: 0 | Status: DISCONTINUED | OUTPATIENT
Start: 2019-05-08 | End: 2019-05-08

## 2019-05-08 RX ORDER — ONDANSETRON 8 MG/1
4 TABLET, FILM COATED ORAL ONCE
Qty: 0 | Refills: 0 | Status: DISCONTINUED | OUTPATIENT
Start: 2019-05-08 | End: 2019-05-08

## 2019-05-08 RX ORDER — FLUOCINONIDE/EMOLLIENT BASE 0.05 %
1 CREAM (GRAM) TOPICAL
Qty: 0 | Refills: 0 | COMMUNITY

## 2019-05-08 RX ORDER — VENLAFAXINE HCL 75 MG
1 CAPSULE, EXT RELEASE 24 HR ORAL
Qty: 0 | Refills: 0 | COMMUNITY

## 2019-05-08 RX ORDER — VENLAFAXINE HCL 75 MG
225 CAPSULE, EXT RELEASE 24 HR ORAL DAILY
Qty: 0 | Refills: 0 | Status: DISCONTINUED | OUTPATIENT
Start: 2019-05-08 | End: 2019-05-14

## 2019-05-08 RX ORDER — LEVOTHYROXINE SODIUM 125 MCG
75 TABLET ORAL DAILY
Qty: 0 | Refills: 0 | Status: DISCONTINUED | OUTPATIENT
Start: 2019-05-08 | End: 2019-05-14

## 2019-05-08 RX ORDER — ALPRAZOLAM 0.25 MG
0.5 TABLET ORAL THREE TIMES A DAY
Qty: 0 | Refills: 0 | Status: DISCONTINUED | OUTPATIENT
Start: 2019-05-08 | End: 2019-05-14

## 2019-05-08 RX ORDER — DOCUSATE SODIUM 100 MG
100 CAPSULE ORAL THREE TIMES A DAY
Qty: 0 | Refills: 0 | Status: DISCONTINUED | OUTPATIENT
Start: 2019-05-08 | End: 2019-05-12

## 2019-05-08 RX ORDER — VERAPAMIL HCL 240 MG
1 CAPSULE, EXTENDED RELEASE PELLETS 24 HR ORAL
Qty: 0 | Refills: 0 | COMMUNITY

## 2019-05-08 RX ORDER — ROSUVASTATIN CALCIUM 5 MG/1
1 TABLET ORAL
Qty: 0 | Refills: 0 | COMMUNITY

## 2019-05-08 RX ORDER — ALPRAZOLAM 0.25 MG
1 TABLET ORAL
Qty: 0 | Refills: 0 | COMMUNITY

## 2019-05-08 RX ORDER — CHOLECALCIFEROL (VITAMIN D3) 125 MCG
1 CAPSULE ORAL
Qty: 0 | Refills: 0 | COMMUNITY

## 2019-05-08 RX ADMIN — Medication 300 MILLIGRAM(S): at 22:15

## 2019-05-08 RX ADMIN — HYDROMORPHONE HYDROCHLORIDE 30 MILLILITER(S): 2 INJECTION INTRAMUSCULAR; INTRAVENOUS; SUBCUTANEOUS at 14:55

## 2019-05-08 RX ADMIN — HYDROMORPHONE HYDROCHLORIDE 30 MILLILITER(S): 2 INJECTION INTRAMUSCULAR; INTRAVENOUS; SUBCUTANEOUS at 19:25

## 2019-05-08 RX ADMIN — CYCLOBENZAPRINE HYDROCHLORIDE 10 MILLIGRAM(S): 10 TABLET, FILM COATED ORAL at 22:35

## 2019-05-08 RX ADMIN — Medication 100 MILLIGRAM(S): at 22:34

## 2019-05-08 RX ADMIN — GABAPENTIN 600 MILLIGRAM(S): 400 CAPSULE ORAL at 22:34

## 2019-05-08 RX ADMIN — SODIUM CHLORIDE 3 MILLILITER(S): 9 INJECTION INTRAMUSCULAR; INTRAVENOUS; SUBCUTANEOUS at 21:54

## 2019-05-08 RX ADMIN — SENNA PLUS 2 TABLET(S): 8.6 TABLET ORAL at 22:34

## 2019-05-08 RX ADMIN — Medication 50 MILLIGRAM(S): at 22:35

## 2019-05-08 RX ADMIN — ATORVASTATIN CALCIUM 20 MILLIGRAM(S): 80 TABLET, FILM COATED ORAL at 22:35

## 2019-05-08 RX ADMIN — HYDROMORPHONE HYDROCHLORIDE 30 MILLILITER(S): 2 INJECTION INTRAMUSCULAR; INTRAVENOUS; SUBCUTANEOUS at 20:03

## 2019-05-08 NOTE — BRIEF OPERATIVE NOTE - NSICDXBRIEFPROCEDURE_GEN_ALL_CORE_FT
PROCEDURES:  Lumbar fusion 08-May-2019 14:10:13  Abner Horne
PROCEDURES:  Lumbar fusion 08-May-2019 14:10:13  Abner Horne

## 2019-05-08 NOTE — BRIEF OPERATIVE NOTE - OPERATION/FINDINGS
severe stenosis L3 L4
+ decompression of thecal sac and nerve roots; adherent dura at L3-4 level with intra-operative CSF leak

## 2019-05-08 NOTE — BRIEF OPERATIVE NOTE - NSICDXBRIEFPOSTOP_GEN_ALL_CORE_FT
POST-OP DIAGNOSIS:  Lumbar stenosis with neurogenic claudication 08-May-2019 14:11:17  Abner Horne
POST-OP DIAGNOSIS:  Lumbar stenosis with neurogenic claudication 08-May-2019 14:11:17  Abner Horne

## 2019-05-08 NOTE — PROGRESS NOTE ADULT - ASSESSMENT
Pt s/p posterior lumbar L3/4 L3/4 laminectomy and hardware fusion extension  POD# 0  pt seen in PACU/ flat in bed and states is at baseline, denied any new or worsening sensorimotor changes  + GOOD drain to bulb suction     neuro-checks q4 HR and monitored bed/    CT CS w/o contrast in AM or Saturday as per Dr Adal kolbt w home meds   HOB flat/ log roll permitted   hold all AC/AP   SBP < 150mmHg  PCA/ jones/ IVF

## 2019-05-08 NOTE — BRIEF OPERATIVE NOTE - NSICDXBRIEFPREOP_GEN_ALL_CORE_FT
PRE-OP DIAGNOSIS:  Spinal stenosis, lumbar region with neurogenic claudication 08-May-2019 14:10:24  Abner Horne
PRE-OP DIAGNOSIS:  Spinal stenosis, lumbar region with neurogenic claudication 08-May-2019 14:10:24  Abner Horne
86

## 2019-05-08 NOTE — PROGRESS NOTE ADULT - SUBJECTIVE AND OBJECTIVE BOX
NEUROSURGERY POST-OP/PROGRESS NOTE:    Pt s/p posterior lumbar L3/4 L3/4 laminectomy and hardware fusion extension  POD# 0  pt seen in PACU/ flat in bed and states is at baseline, denied any new or worsening sensorimotor changes  + PCA   -headache   - Nausea / - Vomiting  denies new/ worsening weakness, some LLE weakness as pre-op  denies numbness/ + mild tingling to RLE tingling  denies visual changes  denies C/TS pain   +LS posterior/ incisional Spine pain   - BM  + bed rest   - diet/ NPO   + jones cath to gravity   + venodynes b/l when in bed   +GOOD drain to bulb suction     MEDICATIONS  (STANDING):  atorvastatin 20 milliGRAM(s) Oral at bedtime  calcium carbonate 1250 mG  + Vitamin D (OsCal 500 + D) 1 Tablet(s) Oral two times a day  cholecalciferol 2000 Unit(s) Oral daily  cyclobenzaprine 10 milliGRAM(s) Oral every 8 hours  docusate sodium 100 milliGRAM(s) Oral three times a day  famotidine    Tablet 20 milliGRAM(s) Oral two times a day  gabapentin 600 milliGRAM(s) Oral three times a day  HYDROmorphone PCA (1 mG/mL) 30 milliLiter(s) PCA Continuous PCA Continuous  lactated ringers. 1000 milliLiter(s) (125 mL/Hr) IV Continuous <Continuous>  levothyroxine 75 MICROGram(s) Oral daily  raloxifene 60 milliGRAM(s) Oral daily  senna 2 Tablet(s) Oral at bedtime  sodium chloride 0.9% lock flush 3 milliLiter(s) IV Push every 8 hours  sodium chloride 0.9%. 1000 milliLiter(s) (100 mL/Hr) IV Continuous <Continuous>  topiramate 50 milliGRAM(s) Oral two times a day  vancomycin  IVPB 1500 milliGRAM(s) IV Intermittent once  venlafaxine XR. 225 milliGRAM(s) Oral daily  verapamil  milliGRAM(s) Oral daily    MEDICATIONS  (PRN):  acetaminophen   Tablet .. 650 milliGRAM(s) Oral every 6 hours PRN Temp greater or equal to 38C (100.4F), Mild Pain (1 - 3)  ALPRAZolam 0.5 milliGRAM(s) Oral three times a day PRN for anxiety  diazepam    Tablet 5 milliGRAM(s) Oral two times a day PRN PRN anxiery  fentaNYL    Injectable 50 MICROGram(s) IV Push every 5 minutes PRN Severe Pain (7 - 10)  lidocaine   Patch 1 Patch Transdermal daily PRN pain, 12 hrs on/ 12 hrs off  magnesium hydroxide Suspension 30 milliLiter(s) Oral every 12 hours PRN Constipation  naloxone Injectable 0.1 milliGRAM(s) IV Push every 3 minutes PRN For ANY of the following changes in patient status:  A. RR LESS THAN 10 breaths per minute, B. Oxygen saturation LESS THAN 90%, C. Sedation score of 6  ondansetron Injectable 4 milliGRAM(s) IV Push every 6 hours PRN Nausea  ondansetron Injectable 4 milliGRAM(s) IV Push once PRN Nausea and/or Vomiting  ondansetron Injectable 4 milliGRAM(s) IV Push every 4 hours PRN Nausea  ZOLMitriptan 5 milliGRAM(s) Oral daily PRN Migraine    Allergies    Biaxin (Rash)  Claritin (Rash)    Intolerances      Vital Signs Last 24 Hrs  T(C): 37.2 (08 May 2019 14:38), Max: 37.2 (08 May 2019 14:38)  T(F): 98.9 (08 May 2019 14:38), Max: 98.9 (08 May 2019 14:38)  HR: 74 (08 May 2019 16:00) (66 - 79)  BP: 136/65 (08 May 2019 16:00) (136/65 - 146/84)  BP(mean): --  RR: 17 (08 May 2019 16:00) (8 - 17)  SpO2: 100% (08 May 2019 16:00) (98% - 100%)        PHYSICAL EXAM:    GENERAL: NAD, well-groomed, well-developed/ overweight, AAOx3 and cooperative  HEAD:  Atraumatic, Normocephalic,  LS: + post-op dressing, + L lower LS GOOD drain to bulb suction   NERVOUS SYSTEM:  Alert & Oriented X3, speech is clear. Good concentration & cooperative; Motor Strength 5/5 B/L upper and -5/5 LLE 2* pain and R HF +4/5 KE 4/5 DF+3/5 PF 4/5, EHL 4/5, sensory is at baseline w some paraesthesia;   EXTREMITIES:  2+ Peripheral Pulses, No clubbing, cyanosis, or edema,   SKIN: No rashes or lesions, post-op dressing intact         LABS: no new labs available for review       RADIOLOGY & ADDITIONAL TESTS: intra-op flouro   no new NSx images available for review       Time Spent with patient/ education: 30 mins on the floor & coordinating care

## 2019-05-08 NOTE — PROGRESS NOTE ADULT - SUBJECTIVE AND OBJECTIVE BOX
NSGY Attg:    Patient seen and examined.  No acute changes since office visit.  + difficulty regulating bowel routine without incontinence.    Exam:  A and O x 3  PERRL  EOMI  FS TML  CORDERO to command  LLE 5/5  RLE IP Q 5/5 DF 4-/5 PF EHL 4/5  decreased sensation to LT over L3, 4, 5, S1 on right    The patient and her  are declining re-explanation of the risks, benefits, and alternatives of surgical intervention as previously discussed and documented in the outpatient chart.  I have answered all their questions.  Ms. Alegre wishes to proceed with surgical intervention.    A/P:  - to OR for L2-3, 3-4 laminectomy and posterolateral fusion, revision of L4-5 fusion, possible L2-3, 3-4 posterior lumbar interbody fusion (PLIF), possible multilevel thoraco-lumbar-sacral decompression and fusion

## 2019-05-09 DIAGNOSIS — R52 PAIN, UNSPECIFIED: ICD-10-CM

## 2019-05-09 DIAGNOSIS — Z79.891 LONG TERM (CURRENT) USE OF OPIATE ANALGESIC: ICD-10-CM

## 2019-05-09 DIAGNOSIS — G89.18 OTHER ACUTE POSTPROCEDURAL PAIN: ICD-10-CM

## 2019-05-09 LAB
ANION GAP SERPL CALC-SCNC: 12 MMOL/L — SIGNIFICANT CHANGE UP (ref 5–17)
BASOPHILS # BLD AUTO: 0 K/UL — SIGNIFICANT CHANGE UP (ref 0–0.2)
BASOPHILS NFR BLD AUTO: 0.1 % — SIGNIFICANT CHANGE UP (ref 0–2)
BUN SERPL-MCNC: 8 MG/DL — SIGNIFICANT CHANGE UP (ref 8–20)
CALCIUM SERPL-MCNC: 7.5 MG/DL — LOW (ref 8.6–10.2)
CHLORIDE SERPL-SCNC: 109 MMOL/L — HIGH (ref 98–107)
CO2 SERPL-SCNC: 19 MMOL/L — LOW (ref 22–29)
CREAT SERPL-MCNC: 0.64 MG/DL — SIGNIFICANT CHANGE UP (ref 0.5–1.3)
EOSINOPHIL # BLD AUTO: 0.1 K/UL — SIGNIFICANT CHANGE UP (ref 0–0.5)
EOSINOPHIL NFR BLD AUTO: 0.7 % — SIGNIFICANT CHANGE UP (ref 0–6)
GLUCOSE SERPL-MCNC: 141 MG/DL — HIGH (ref 70–115)
HCT VFR BLD CALC: 34.8 % — LOW (ref 37–47)
HGB BLD-MCNC: 11.1 G/DL — LOW (ref 12–16)
LYMPHOCYTES # BLD AUTO: 1.3 K/UL — SIGNIFICANT CHANGE UP (ref 1–4.8)
LYMPHOCYTES # BLD AUTO: 10.6 % — LOW (ref 20–55)
MCHC RBC-ENTMCNC: 28.2 PG — SIGNIFICANT CHANGE UP (ref 27–31)
MCHC RBC-ENTMCNC: 31.9 G/DL — LOW (ref 32–36)
MCV RBC AUTO: 88.3 FL — SIGNIFICANT CHANGE UP (ref 81–99)
MONOCYTES # BLD AUTO: 1.1 K/UL — HIGH (ref 0–0.8)
MONOCYTES NFR BLD AUTO: 8.7 % — SIGNIFICANT CHANGE UP (ref 3–10)
NEUTROPHILS # BLD AUTO: 9.7 K/UL — HIGH (ref 1.8–8)
NEUTROPHILS NFR BLD AUTO: 79.7 % — HIGH (ref 37–73)
NIGHT BLUE STAIN TISS: SIGNIFICANT CHANGE UP
PLATELET # BLD AUTO: 191 K/UL — SIGNIFICANT CHANGE UP (ref 150–400)
POTASSIUM SERPL-MCNC: 3.2 MMOL/L — LOW (ref 3.5–5.3)
POTASSIUM SERPL-SCNC: 3.2 MMOL/L — LOW (ref 3.5–5.3)
RBC # BLD: 3.94 M/UL — LOW (ref 4.4–5.2)
RBC # FLD: 14.5 % — SIGNIFICANT CHANGE UP (ref 11–15.6)
SODIUM SERPL-SCNC: 140 MMOL/L — SIGNIFICANT CHANGE UP (ref 135–145)
SPECIMEN SOURCE: SIGNIFICANT CHANGE UP
WBC # BLD: 12.2 K/UL — HIGH (ref 4.8–10.8)
WBC # FLD AUTO: 12.2 K/UL — HIGH (ref 4.8–10.8)

## 2019-05-09 PROCEDURE — 99223 1ST HOSP IP/OBS HIGH 75: CPT | Mod: GC

## 2019-05-09 RX ORDER — DEXTROSE MONOHYDRATE, SODIUM CHLORIDE, AND POTASSIUM CHLORIDE 50; .745; 4.5 G/1000ML; G/1000ML; G/1000ML
1000 INJECTION, SOLUTION INTRAVENOUS
Refills: 0 | Status: DISCONTINUED | OUTPATIENT
Start: 2019-05-09 | End: 2019-05-10

## 2019-05-09 RX ADMIN — CYCLOBENZAPRINE HYDROCHLORIDE 10 MILLIGRAM(S): 10 TABLET, FILM COATED ORAL at 06:19

## 2019-05-09 RX ADMIN — SENNA PLUS 2 TABLET(S): 8.6 TABLET ORAL at 23:45

## 2019-05-09 RX ADMIN — Medication 2000 UNIT(S): at 12:04

## 2019-05-09 RX ADMIN — SODIUM CHLORIDE 3 MILLILITER(S): 9 INJECTION INTRAMUSCULAR; INTRAVENOUS; SUBCUTANEOUS at 13:19

## 2019-05-09 RX ADMIN — HYDROMORPHONE HYDROCHLORIDE 30 MILLILITER(S): 2 INJECTION INTRAMUSCULAR; INTRAVENOUS; SUBCUTANEOUS at 19:29

## 2019-05-09 RX ADMIN — Medication 100 MILLIGRAM(S): at 23:45

## 2019-05-09 RX ADMIN — Medication 75 MICROGRAM(S): at 06:18

## 2019-05-09 RX ADMIN — Medication 50 MILLIGRAM(S): at 17:06

## 2019-05-09 RX ADMIN — GABAPENTIN 600 MILLIGRAM(S): 400 CAPSULE ORAL at 23:45

## 2019-05-09 RX ADMIN — Medication 650 MILLIGRAM(S): at 13:17

## 2019-05-09 RX ADMIN — Medication 1 TABLET(S): at 06:19

## 2019-05-09 RX ADMIN — CYCLOBENZAPRINE HYDROCHLORIDE 10 MILLIGRAM(S): 10 TABLET, FILM COATED ORAL at 23:45

## 2019-05-09 RX ADMIN — Medication 100 MILLIGRAM(S): at 13:17

## 2019-05-09 RX ADMIN — Medication 650 MILLIGRAM(S): at 14:00

## 2019-05-09 RX ADMIN — HYDROMORPHONE HYDROCHLORIDE 30 MILLILITER(S): 2 INJECTION INTRAMUSCULAR; INTRAVENOUS; SUBCUTANEOUS at 08:00

## 2019-05-09 RX ADMIN — DEXTROSE MONOHYDRATE, SODIUM CHLORIDE, AND POTASSIUM CHLORIDE 100 MILLILITER(S): 50; .745; 4.5 INJECTION, SOLUTION INTRAVENOUS at 13:17

## 2019-05-09 RX ADMIN — RALOXIFENE HYDROCHLORIDE 60 MILLIGRAM(S): 60 TABLET, COATED ORAL at 12:04

## 2019-05-09 RX ADMIN — GABAPENTIN 600 MILLIGRAM(S): 400 CAPSULE ORAL at 13:18

## 2019-05-09 RX ADMIN — Medication 1 TABLET(S): at 17:06

## 2019-05-09 RX ADMIN — FAMOTIDINE 20 MILLIGRAM(S): 10 INJECTION INTRAVENOUS at 06:18

## 2019-05-09 RX ADMIN — FAMOTIDINE 20 MILLIGRAM(S): 10 INJECTION INTRAVENOUS at 17:07

## 2019-05-09 RX ADMIN — ATORVASTATIN CALCIUM 20 MILLIGRAM(S): 80 TABLET, FILM COATED ORAL at 23:45

## 2019-05-09 RX ADMIN — DEXTROSE MONOHYDRATE, SODIUM CHLORIDE, AND POTASSIUM CHLORIDE 100 MILLILITER(S): 50; .745; 4.5 INJECTION, SOLUTION INTRAVENOUS at 23:45

## 2019-05-09 RX ADMIN — Medication 225 MILLIGRAM(S): at 12:04

## 2019-05-09 RX ADMIN — SODIUM CHLORIDE 3 MILLILITER(S): 9 INJECTION INTRAMUSCULAR; INTRAVENOUS; SUBCUTANEOUS at 05:56

## 2019-05-09 RX ADMIN — SODIUM CHLORIDE 3 MILLILITER(S): 9 INJECTION INTRAMUSCULAR; INTRAVENOUS; SUBCUTANEOUS at 23:42

## 2019-05-09 RX ADMIN — Medication 240 MILLIGRAM(S): at 06:18

## 2019-05-09 RX ADMIN — GABAPENTIN 600 MILLIGRAM(S): 400 CAPSULE ORAL at 06:18

## 2019-05-09 RX ADMIN — Medication 100 MILLIGRAM(S): at 06:19

## 2019-05-09 RX ADMIN — Medication 50 MILLIGRAM(S): at 06:18

## 2019-05-09 RX ADMIN — CYCLOBENZAPRINE HYDROCHLORIDE 10 MILLIGRAM(S): 10 TABLET, FILM COATED ORAL at 13:18

## 2019-05-09 NOTE — CONSULT NOTE ADULT - ATTENDING COMMENTS
Patient seen and examined and cased discussed with resident. Agree with recommendations.     Therapy evals pending. Will continue to follow. Functional progress will determine ongoing rehab dispo recommendations, which may change.    Start bedside therapy as well as OOB throughout the day with mobilization by staff to maintain cardiopulmonary function and prevention of secondary complications related to debility.

## 2019-05-09 NOTE — CONSULT NOTE ADULT - SUBJECTIVE AND OBJECTIVE BOX
VERBAL REPORT: "It helps a little."  Patient reports partial analgesia with current regimen.  PAIN SCORE: 6/10 at rest    (VNRS)      Allergies  Biaxin (Rash)  Claritin (Rash)      THERAPY:  [ ] IV PCA Morphine		[ ] 5 mg/mL	[ ] 1 mg/mL  [X] IV PCA Hydromorphone	[ ] 5 mg/mL	[X] 1 mg/mL  [ ] IV PCA Fentanyl		[ ] 50 micrograms/mL  Demand dose _0.2mg_ lockout _6_ (minutes) Continuous Rate _0_ Total: _3.2mg_  Daily      MEDICATIONS  (STANDING):  atorvastatin 20 milliGRAM(s) Oral at bedtime  calcium carbonate 1250 mG  + Vitamin D (OsCal 500 + D) 1 Tablet(s) Oral two times a day  cholecalciferol 2000 Unit(s) Oral daily  cyclobenzaprine 10 milliGRAM(s) Oral every 8 hours  docusate sodium 100 milliGRAM(s) Oral three times a day  famotidine    Tablet 20 milliGRAM(s) Oral two times a day  gabapentin 600 milliGRAM(s) Oral three times a day  HYDROmorphone PCA (1 mG/mL) 30 milliLiter(s) PCA Continuous PCA Continuous  levothyroxine 75 MICROGram(s) Oral daily  raloxifene 60 milliGRAM(s) Oral daily  senna 2 Tablet(s) Oral at bedtime  sodium chloride 0.9% lock flush 3 milliLiter(s) IV Push every 8 hours  sodium chloride 0.9% with potassium chloride 20 mEq/L 1000 milliLiter(s) (100 mL/Hr) IV Continuous <Continuous>  topiramate 50 milliGRAM(s) Oral two times a day  venlafaxine XR. 225 milliGRAM(s) Oral daily  verapamil  milliGRAM(s) Oral daily    MEDICATIONS  (PRN):  acetaminophen   Tablet .. 650 milliGRAM(s) Oral every 6 hours PRN Temp greater or equal to 38C (100.4F), Mild Pain (1 - 3)  ALPRAZolam 0.5 milliGRAM(s) Oral three times a day PRN for anxiety  diazepam    Tablet 5 milliGRAM(s) Oral two times a day PRN PRN anxiery  lidocaine   Patch 1 Patch Transdermal daily PRN pain, 12 hrs on/ 12 hrs off  magnesium hydroxide Suspension 30 milliLiter(s) Oral every 12 hours PRN Constipation  naloxone Injectable 0.1 milliGRAM(s) IV Push every 3 minutes PRN For ANY of the following changes in patient status:  A. RR LESS THAN 10 breaths per minute, B. Oxygen saturation LESS THAN 90%, C. Sedation score of 6  ondansetron Injectable 4 milliGRAM(s) IV Push every 6 hours PRN Nausea  ondansetron Injectable 4 milliGRAM(s) IV Push every 4 hours PRN Nausea  ZOLMitriptan 5 milliGRAM(s) Oral daily PRN Migraine        OBJECTIVE:  Sedation Score:	[X] Alert	[ ] Drowsy	[ ] Arousable	[ ] Asleep	[ ] Unresponsive  Side Effects:	[X] None	[ ] Nausea	[ ] Vomiting	[ ] Pruritus	  [ ] Weakness		[ ] Numbness	[ ] Other:      PHYSICAL EXAM:  GENERAL: NAD,  well-developed  HEAD:  Atraumatic, Normocephalic  NERVOUS SYSTEM: Asleep, awakens to verbal stimuli, & Oriented X3, Drowsy; CORDERO  CHEST/LUNG: Soft, mumbled speech  ABDOMEN: Nontende; Bowel sounds present      LABS:                        11.1   12.2  )-----------( 191      ( 09 May 2019 08:49 )             34.8       05-09    140  |  109<H>  |  8.0  ----------------------------<  141<H>  3.2<L>   |  19.0<L>  |  0.64    Ca    7.5<L>      09 May 2019 08:49

## 2019-05-09 NOTE — CONSULT NOTE ADULT - PROBLEM SELECTOR RECOMMENDATION 9
1. No changes to current setting  2. Discontinue use and offer PRN opioids for concern of oversedation  3. Supplement with IV Tylenol q6hrs x48hrs to minimize opioid requirements

## 2019-05-09 NOTE — CONSULT NOTE ADULT - PROBLEM SELECTOR RECOMMENDATION 3
1. Add Oxycontin 10mg PO BID, when more alert  - To cover baseline home medication  2. Maximize use of non-opioids  - Continue Gabapentin and Lidoderm patches as ordered  3. Follow up with outpatient pain management provider after discharge

## 2019-05-09 NOTE — CONSULT NOTE ADULT - ASSESSMENT
68 y/o female with lumbar stenosis with neurogenic claudication, now POD #1 Lumbar fusion on IV PCA. She used Hydrocodone 10mg BID and 20mg PO QHS at home. She is drowsy, arousable and appropriately answers questions, then falls asleep again. Spouse, at the bedside, explains that she typically takes a while to recover following Anesthesia. Use of PCA reinforced. Spouse understands that only patient may press button. Discussed no additional sedating medications, until patient is more alert.     PLAN    Therapy to  be:	[X] Continue   [ ] Discontinued   [ ] Change to prn Analgesics    Documentation and Verification of current medications:  [X] Done	[ ] Not done, not eligible  [ ] Not done, reason not given    [x]  [facility] Reviewed. Reference #[id number]

## 2019-05-09 NOTE — CONSULT NOTE ADULT - PROBLEM SELECTOR RECOMMENDATION 2
1. Stop Valium  2. Change to oral analgesics for BTP when more alert  - Offer Oxycodone 5 or 10mg PO q4hrs PRN moderate or severe pain, respectively  3. Hold Flexeril for oversedation

## 2019-05-09 NOTE — CONSULT NOTE ADULT - SUBJECTIVE AND OBJECTIVE BOX
69yF was admitted on 05-08    HPI  Patient is a 69y old  Female who presents with a chief complaint of Lumbar stenosis & RLE>LLE radiculopathy.  Pt underwent posterior lumbar L3/4 L3/4 laminectomy and hardware fusion extension with Dr. Galeas on 5/8.  Course notable for intra-operative CSF leak.  Post-operative course thus far uncomplicated.      REVIEW OF SYSTEMS  Constitutional - No fever, No weight loss, No fatigue  HEENT - No eye pain, No visual disturbances, No difficulty hearing, No tinnitus, No vertigo, No neck pain  Respiratory - No cough, No wheezing, No shortness of breath  Cardiovascular - No chest pain, No palpitations  Gastrointestinal - No abdominal pain, No nausea, No vomiting, No diarrhea, No constipation  Genitourinary - No dysuria, No frequency, No hematuria, No incontinence  Neurological - No headaches, No memory loss, No loss of strength, No numbness, No tremors  Skin - No itching, No rashes, No lesions   Endocrine - No temperature intolerance  Musculoskeletal - No joint pain, No joint swelling, No muscle pain  Psychiatric - No depression, No anxiety    VITALS  T(C): 36.8 (05-09-19 @ 09:00), Max: 37.9 (05-09-19 @ 08:00)  HR: 93 (05-09-19 @ 08:00) (66 - 93)  BP: 135/71 (05-09-19 @ 08:00) (119/74 - 152/54)  RR: 17 (05-09-19 @ 08:00) (8 - 18)  SpO2: 94% (05-09-19 @ 08:00) (94% - 100%)  Wt(kg): --    PAST MEDICAL & SURGICAL HISTORY  Mariola-Cai tear  IBS (irritable bowel syndrome)  Raynaud disease  Colon cancer  GERD (gastroesophageal reflux disease)  Chronic pain  Migraine  Hypothyroid  Hyperlipidemia  S/P cholecystectomy  S/P colon resection  S/P lumbar fusion  S/P tonsillectomy      SOCIAL HISTORY  Smoking - Denied  EtOH - Denied   Drugs - Denied    FUNCTIONAL HISTORY  Lives   Independent    CURRENT FUNCTIONAL STATUS      FAMILY HISTORY   Family history of acute myeloid leukemia  Family history of kidney cancer      RECENT LABS/IMAGING  CBC Full  -  ( 09 May 2019 08:49 )  WBC Count : 12.2 K/uL  RBC Count : 3.94 M/uL  Hemoglobin : 11.1 g/dL  Hematocrit : 34.8 %  Platelet Count - Automated : 191 K/uL  Mean Cell Volume : 88.3 fl  Mean Cell Hemoglobin : 28.2 pg  Mean Cell Hemoglobin Concentration : 31.9 g/dL  Auto Neutrophil # : 9.7 K/uL  Auto Lymphocyte # : 1.3 K/uL  Auto Monocyte # : 1.1 K/uL  Auto Eosinophil # : 0.1 K/uL  Auto Basophil # : 0.0 K/uL  Auto Neutrophil % : 79.7 %  Auto Lymphocyte % : 10.6 %  Auto Monocyte % : 8.7 %  Auto Eosinophil % : 0.7 %  Auto Basophil % : 0.1 %    05-09    140  |  109<H>  |  8.0  ----------------------------<  141<H>  3.2<L>   |  19.0<L>  |  0.64    Ca    7.5<L>      09 May 2019 08:49          ALLERGIES  Biaxin (Rash)  Claritin (Rash)      MEDICATIONS   acetaminophen   Tablet .. 650 milliGRAM(s) Oral every 6 hours PRN  ALPRAZolam 0.5 milliGRAM(s) Oral three times a day PRN  atorvastatin 20 milliGRAM(s) Oral at bedtime  calcium carbonate 1250 mG  + Vitamin D (OsCal 500 + D) 1 Tablet(s) Oral two times a day  cholecalciferol 2000 Unit(s) Oral daily  cyclobenzaprine 10 milliGRAM(s) Oral every 8 hours  diazepam    Tablet 5 milliGRAM(s) Oral two times a day PRN  docusate sodium 100 milliGRAM(s) Oral three times a day  famotidine    Tablet 20 milliGRAM(s) Oral two times a day  gabapentin 600 milliGRAM(s) Oral three times a day  HYDROmorphone PCA (1 mG/mL) 30 milliLiter(s) PCA Continuous PCA Continuous  levothyroxine 75 MICROGram(s) Oral daily  lidocaine   Patch 1 Patch Transdermal daily PRN  magnesium hydroxide Suspension 30 milliLiter(s) Oral every 12 hours PRN  naloxone Injectable 0.1 milliGRAM(s) IV Push every 3 minutes PRN  ondansetron Injectable 4 milliGRAM(s) IV Push every 6 hours PRN  ondansetron Injectable 4 milliGRAM(s) IV Push every 4 hours PRN  raloxifene 60 milliGRAM(s) Oral daily  senna 2 Tablet(s) Oral at bedtime  sodium chloride 0.9% lock flush 3 milliLiter(s) IV Push every 8 hours  sodium chloride 0.9%. 1000 milliLiter(s) IV Continuous <Continuous>  topiramate 50 milliGRAM(s) Oral two times a day  venlafaxine XR. 225 milliGRAM(s) Oral daily  verapamil  milliGRAM(s) Oral daily  ZOLMitriptan 5 milliGRAM(s) Oral daily PRN 69yF was admitted on 05-08    HPI  Patient is a 69y old  Female who presents with a chief complaint of Lumbar stenosis & RLE>LLE radiculopathy.  Pt underwent L2-3, L3-4 laminectomy/fusion with revision of fusion at L4-5   with Dr. Galeas on 5/8.  Course notable for intra-operative CSF leak.  Post-operative course thus far uncomplicated.      REVIEW OF SYSTEMS - limited 2/2 pt fatigue  Constitutional - + fatigue  HEENT -  No neck pain  Respiratory - No cough, No wheezing, No shortness of breath  Cardiovascular - No chest pain, No palpitations  Gastrointestinal - No abdominal pain, No nausea, No vomiting  Genitourinary - +jones  Neurological - No headaches,, +weakness, No numbness, +paresthesias in b/l LE  Skin - No itching, No rashes, No lesions   Musculoskeletal - +back, leg, flank pain  Psychiatric - No depression, No anxiety    VITALS  T(C): 36.8 (05-09-19 @ 09:00), Max: 37.9 (05-09-19 @ 08:00)  HR: 93 (05-09-19 @ 08:00) (66 - 93)  BP: 135/71 (05-09-19 @ 08:00) (119/74 - 152/54)  RR: 17 (05-09-19 @ 08:00) (8 - 18)  SpO2: 94% (05-09-19 @ 08:00) (94% - 100%)  Wt(kg): --    PAST MEDICAL & SURGICAL HISTORY  Mariola-Cai tear  IBS (irritable bowel syndrome)  Raynaud disease  Colon cancer  GERD (gastroesophageal reflux disease)  Chronic pain  Migraine  Hypothyroid  Hyperlipidemia  S/P cholecystectomy  S/P colon resection  S/P lumbar fusion  S/P tonsillectomy      SOCIAL HISTORY  Smoking - Denied  EtOH - Denied   Drugs - Denied    FUNCTIONAL HISTORY   Lives with  in single story private home.  2 OMAR.  Independent    CURRENT FUNCTIONAL STATUS  not yet seen by PT; per MD orders, PT to start 5/10    FAMILY HISTORY   Family history of acute myeloid leukemia  Family history of kidney cancer      RECENT LABS/IMAGING  CBC Full  -  ( 09 May 2019 08:49 )  WBC Count : 12.2 K/uL  RBC Count : 3.94 M/uL  Hemoglobin : 11.1 g/dL  Hematocrit : 34.8 %  Platelet Count - Automated : 191 K/uL  Mean Cell Volume : 88.3 fl  Mean Cell Hemoglobin : 28.2 pg  Mean Cell Hemoglobin Concentration : 31.9 g/dL  Auto Neutrophil # : 9.7 K/uL  Auto Lymphocyte # : 1.3 K/uL  Auto Monocyte # : 1.1 K/uL  Auto Eosinophil # : 0.1 K/uL  Auto Basophil # : 0.0 K/uL  Auto Neutrophil % : 79.7 %  Auto Lymphocyte % : 10.6 %  Auto Monocyte % : 8.7 %  Auto Eosinophil % : 0.7 %  Auto Basophil % : 0.1 %    05-09    140  |  109<H>  |  8.0  ----------------------------<  141<H>  3.2<L>   |  19.0<L>  |  0.64    Ca    7.5<L>      09 May 2019 08:49          ALLERGIES  Biaxin (Rash)  Claritin (Rash)      MEDICATIONS   acetaminophen   Tablet .. 650 milliGRAM(s) Oral every 6 hours PRN  ALPRAZolam 0.5 milliGRAM(s) Oral three times a day PRN  atorvastatin 20 milliGRAM(s) Oral at bedtime  calcium carbonate 1250 mG  + Vitamin D (OsCal 500 + D) 1 Tablet(s) Oral two times a day  cholecalciferol 2000 Unit(s) Oral daily  cyclobenzaprine 10 milliGRAM(s) Oral every 8 hours  diazepam    Tablet 5 milliGRAM(s) Oral two times a day PRN  docusate sodium 100 milliGRAM(s) Oral three times a day  famotidine    Tablet 20 milliGRAM(s) Oral two times a day  gabapentin 600 milliGRAM(s) Oral three times a day  HYDROmorphone PCA (1 mG/mL) 30 milliLiter(s) PCA Continuous PCA Continuous  levothyroxine 75 MICROGram(s) Oral daily  lidocaine   Patch 1 Patch Transdermal daily PRN  magnesium hydroxide Suspension 30 milliLiter(s) Oral every 12 hours PRN  naloxone Injectable 0.1 milliGRAM(s) IV Push every 3 minutes PRN  ondansetron Injectable 4 milliGRAM(s) IV Push every 6 hours PRN  ondansetron Injectable 4 milliGRAM(s) IV Push every 4 hours PRN  raloxifene 60 milliGRAM(s) Oral daily  senna 2 Tablet(s) Oral at bedtime  sodium chloride 0.9% lock flush 3 milliLiter(s) IV Push every 8 hours  sodium chloride 0.9%. 1000 milliLiter(s) IV Continuous <Continuous>  topiramate 50 milliGRAM(s) Oral two times a day  venlafaxine XR. 225 milliGRAM(s) Oral daily  verapamil  milliGRAM(s) Oral daily  ZOLMitriptan 5 milliGRAM(s) Oral daily PRN    ----------------------------------------------------------------------------------------  PHYSICAL EXAM   Constitutional - NAD, easily arousable but falls asleep frequently during interview/exam.  pt needs frequent prompting to follow commands.  HEENT - NCAT, EOMI  Neck - Supple, No limited ROM  Chest - Breathing comfortably, No wheezing  Cardiovascular - S1S2   Abdomen - Soft   Extremities - No C/C/E, No calf tenderness   Neurologic Exam -                    Cognitive - fatigued; oriented to self, place, year     Communication - speech slurred     Cranial Nerves - CN 2-12 intact     Motor -                     LEFT    UE - ShAB 5/5, EF 5/5, EE 5/5, WE 5/5,  5/5                    RIGHT UE - ShAB 5/5, EF 5/5, EE 5/5, WE 5/5,  5/5                    LEFT    LE - HF 1/5, KE 5/5, DF 5/5, PF 5/5                    RIGHT LE - HF 1/5, KE 5/5, DF 5/5, PF 5/5        Sensory - Intact to LT     Reflexes - DTR Intact, No primitive reflexive     Psychiatric - Mood stable, Affect WNL 69yF was admitted on 05-08    HPI  Patient is a 69y old  Female who presents with a chief complaint of Lumbar stenosis & RLE>LLE radiculopathy.  Pt underwent L2-3, L3-4 laminectomy/fusion with revision of fusion at L4-5   with Dr. Galeas on 5/8.  Course notable for intra-operative CSF leak.  Post-operative course thus far uncomplicated.      REVIEW OF SYSTEMS - limited 2/2 pt fatigue  Constitutional - + fatigue  HEENT -  No neck pain  Respiratory - No cough, No wheezing, No shortness of breath  Cardiovascular - No chest pain, No palpitations  Gastrointestinal - No abdominal pain, No nausea, No vomiting  Genitourinary - +jones  Neurological - No headaches,, +weakness, No numbness, +paresthesias in b/l LE  Skin - No itching, No rashes, No lesions   Musculoskeletal - +back, leg, flank pain  Psychiatric - No depression, No anxiety    VITALS  T(C): 36.8 (05-09-19 @ 09:00), Max: 37.9 (05-09-19 @ 08:00)  HR: 93 (05-09-19 @ 08:00) (66 - 93)  BP: 135/71 (05-09-19 @ 08:00) (119/74 - 152/54)  RR: 17 (05-09-19 @ 08:00) (8 - 18)  SpO2: 94% (05-09-19 @ 08:00) (94% - 100%)  Wt(kg): --    PAST MEDICAL & SURGICAL HISTORY  Mariola-Cai tear  IBS (irritable bowel syndrome)  Raynaud disease  Colon cancer  GERD (gastroesophageal reflux disease)  Chronic pain  Migraine  Hypothyroid  Hyperlipidemia  S/P cholecystectomy  S/P colon resection  S/P lumbar fusion  S/P tonsillectomy      SOCIAL HISTORY  Smoking - Denied  EtOH - Denied   Drugs - Denied    FUNCTIONAL HISTORY   Lives with  in single story private home.  2 OMAR. No assistive devices PTA.   Independent    CURRENT FUNCTIONAL STATUS  not yet seen by PT; per MD orders, PT to start 5/10    FAMILY HISTORY   Family history of acute myeloid leukemia  Family history of kidney cancer      RECENT LABS/IMAGING  CBC Full  -  ( 09 May 2019 08:49 )  WBC Count : 12.2 K/uL  RBC Count : 3.94 M/uL  Hemoglobin : 11.1 g/dL  Hematocrit : 34.8 %  Platelet Count - Automated : 191 K/uL  Mean Cell Volume : 88.3 fl  Mean Cell Hemoglobin : 28.2 pg  Mean Cell Hemoglobin Concentration : 31.9 g/dL  Auto Neutrophil # : 9.7 K/uL  Auto Lymphocyte # : 1.3 K/uL  Auto Monocyte # : 1.1 K/uL  Auto Eosinophil # : 0.1 K/uL  Auto Basophil # : 0.0 K/uL  Auto Neutrophil % : 79.7 %  Auto Lymphocyte % : 10.6 %  Auto Monocyte % : 8.7 %  Auto Eosinophil % : 0.7 %  Auto Basophil % : 0.1 %    05-09    140  |  109<H>  |  8.0  ----------------------------<  141<H>  3.2<L>   |  19.0<L>  |  0.64    Ca    7.5<L>      09 May 2019 08:49          ALLERGIES  Biaxin (Rash)  Claritin (Rash)      MEDICATIONS   acetaminophen   Tablet .. 650 milliGRAM(s) Oral every 6 hours PRN  ALPRAZolam 0.5 milliGRAM(s) Oral three times a day PRN  atorvastatin 20 milliGRAM(s) Oral at bedtime  calcium carbonate 1250 mG  + Vitamin D (OsCal 500 + D) 1 Tablet(s) Oral two times a day  cholecalciferol 2000 Unit(s) Oral daily  cyclobenzaprine 10 milliGRAM(s) Oral every 8 hours  diazepam    Tablet 5 milliGRAM(s) Oral two times a day PRN  docusate sodium 100 milliGRAM(s) Oral three times a day  famotidine    Tablet 20 milliGRAM(s) Oral two times a day  gabapentin 600 milliGRAM(s) Oral three times a day  HYDROmorphone PCA (1 mG/mL) 30 milliLiter(s) PCA Continuous PCA Continuous  levothyroxine 75 MICROGram(s) Oral daily  lidocaine   Patch 1 Patch Transdermal daily PRN  magnesium hydroxide Suspension 30 milliLiter(s) Oral every 12 hours PRN  naloxone Injectable 0.1 milliGRAM(s) IV Push every 3 minutes PRN  ondansetron Injectable 4 milliGRAM(s) IV Push every 6 hours PRN  ondansetron Injectable 4 milliGRAM(s) IV Push every 4 hours PRN  raloxifene 60 milliGRAM(s) Oral daily  senna 2 Tablet(s) Oral at bedtime  sodium chloride 0.9% lock flush 3 milliLiter(s) IV Push every 8 hours  sodium chloride 0.9%. 1000 milliLiter(s) IV Continuous <Continuous>  topiramate 50 milliGRAM(s) Oral two times a day  venlafaxine XR. 225 milliGRAM(s) Oral daily  verapamil  milliGRAM(s) Oral daily  ZOLMitriptan 5 milliGRAM(s) Oral daily PRN    ----------------------------------------------------------------------------------------  PHYSICAL EXAM   Constitutional - NAD, easily arousable but falls asleep frequently during interview/exam.  pt needs frequent prompting to follow commands.  HEENT - NCAT, EOMI  Neck - Supple, No limited ROM  Chest - Breathing comfortably, No wheezing  Cardiovascular - S1S2   Abdomen - Soft   Extremities - No C/C/E, No calf tenderness   Neurologic Exam -                    Cognitive - fatigued; oriented to self, place, year     Communication - speech slurred     Cranial Nerves - CN 2-12 intact     Motor -                     LEFT    UE - ShAB 5/5, EF 5/5, EE 5/5, WE 5/5,  5/5                    RIGHT UE - ShAB 5/5, EF 5/5, EE 5/5, WE 5/5,  5/5                    LEFT    LE - HF 1/5, KE 5/5, DF 5/5, PF 5/5                    RIGHT LE - HF 1/5, KE 5/5, DF 5/5, PF 5/5        Sensory - Intact to LT     Reflexes - DTR Intact, No primitive reflexive     Psychiatric - Mood stable, Affect WNL  ----------------------------------------------------------------------------------------    ASSESSMENT/PLAN  69y Female with functional deficits after L2-5 lami/fusion on 5/8  Pain - Tylenol, flexeril, gabapentin, dilaudid, lidocaine patch.  Pain management consult.  Hypothyroid - Synthroid  Migraine - topamax, zomig  DVT PPX - SCDs  Rehab -   Pt is on bedrest until 5/10.  She will likely require rehab services after discharge, but will defer recommendation until after seen by PT.  Will continue to follow for ongoing rehab needs and recommendations. 69yF was admitted on 05-08    HPI  Patient is a 69y old  Female who presents with a chief complaint of Lumbar stenosis & RLE>LLE radiculopathy.  Pt underwent L2-3, L3-4 laminectomy/fusion with revision of fusion at L4-5   with Dr. Galeas on 5/8.  Course notable for intra-operative CSF leak.  Post-operative course thus far uncomplicated.      REVIEW OF SYSTEMS - limited 2/2 pt fatigue  Constitutional - + fatigue  HEENT -  No neck pain  Respiratory - No cough, No wheezing, No shortness of breath  Cardiovascular - No chest pain, No palpitations  Gastrointestinal - No abdominal pain, No nausea, No vomiting  Genitourinary - +jones  Neurological - No headaches,, +weakness, No numbness, +paresthesias in b/l LE  Skin - No itching, No rashes, No lesions   Musculoskeletal - +back, leg, flank pain  Psychiatric - No depression, No anxiety    VITALS  T(C): 36.8 (05-09-19 @ 09:00), Max: 37.9 (05-09-19 @ 08:00)  HR: 93 (05-09-19 @ 08:00) (66 - 93)  BP: 135/71 (05-09-19 @ 08:00) (119/74 - 152/54)  RR: 17 (05-09-19 @ 08:00) (8 - 18)  SpO2: 94% (05-09-19 @ 08:00) (94% - 100%)  Wt(kg): --    PAST MEDICAL & SURGICAL HISTORY  Mariola-Cai tear  IBS (irritable bowel syndrome)  Raynaud disease  Colon cancer  GERD (gastroesophageal reflux disease)  Chronic pain  Migraine  Hypothyroid  Hyperlipidemia  S/P cholecystectomy  S/P colon resection  S/P lumbar fusion  S/P tonsillectomy      SOCIAL HISTORY  Smoking - Denied  EtOH - Denied   Drugs - Denied    FUNCTIONAL HISTORY   Lives with  in single story private home.  2 OMAR. No assistive devices PTA.   Independent    CURRENT FUNCTIONAL STATUS  not yet seen by PT; per MD orders, PT to start 5/10    FAMILY HISTORY   Family history of acute myeloid leukemia  Family history of kidney cancer      RECENT LABS/IMAGING  CBC Full  -  ( 09 May 2019 08:49 )  WBC Count : 12.2 K/uL  RBC Count : 3.94 M/uL  Hemoglobin : 11.1 g/dL  Hematocrit : 34.8 %  Platelet Count - Automated : 191 K/uL  Mean Cell Volume : 88.3 fl  Mean Cell Hemoglobin : 28.2 pg  Mean Cell Hemoglobin Concentration : 31.9 g/dL  Auto Neutrophil # : 9.7 K/uL  Auto Lymphocyte # : 1.3 K/uL  Auto Monocyte # : 1.1 K/uL  Auto Eosinophil # : 0.1 K/uL  Auto Basophil # : 0.0 K/uL  Auto Neutrophil % : 79.7 %  Auto Lymphocyte % : 10.6 %  Auto Monocyte % : 8.7 %  Auto Eosinophil % : 0.7 %  Auto Basophil % : 0.1 %    05-09    140  |  109<H>  |  8.0  ----------------------------<  141<H>  3.2<L>   |  19.0<L>  |  0.64    Ca    7.5<L>      09 May 2019 08:49          ALLERGIES  Biaxin (Rash)  Claritin (Rash)      MEDICATIONS   acetaminophen   Tablet .. 650 milliGRAM(s) Oral every 6 hours PRN  ALPRAZolam 0.5 milliGRAM(s) Oral three times a day PRN  atorvastatin 20 milliGRAM(s) Oral at bedtime  calcium carbonate 1250 mG  + Vitamin D (OsCal 500 + D) 1 Tablet(s) Oral two times a day  cholecalciferol 2000 Unit(s) Oral daily  cyclobenzaprine 10 milliGRAM(s) Oral every 8 hours  diazepam    Tablet 5 milliGRAM(s) Oral two times a day PRN  docusate sodium 100 milliGRAM(s) Oral three times a day  famotidine    Tablet 20 milliGRAM(s) Oral two times a day  gabapentin 600 milliGRAM(s) Oral three times a day  HYDROmorphone PCA (1 mG/mL) 30 milliLiter(s) PCA Continuous PCA Continuous  levothyroxine 75 MICROGram(s) Oral daily  lidocaine   Patch 1 Patch Transdermal daily PRN  magnesium hydroxide Suspension 30 milliLiter(s) Oral every 12 hours PRN  naloxone Injectable 0.1 milliGRAM(s) IV Push every 3 minutes PRN  ondansetron Injectable 4 milliGRAM(s) IV Push every 6 hours PRN  ondansetron Injectable 4 milliGRAM(s) IV Push every 4 hours PRN  raloxifene 60 milliGRAM(s) Oral daily  senna 2 Tablet(s) Oral at bedtime  sodium chloride 0.9% lock flush 3 milliLiter(s) IV Push every 8 hours  sodium chloride 0.9%. 1000 milliLiter(s) IV Continuous <Continuous>  topiramate 50 milliGRAM(s) Oral two times a day  venlafaxine XR. 225 milliGRAM(s) Oral daily  verapamil  milliGRAM(s) Oral daily  ZOLMitriptan 5 milliGRAM(s) Oral daily PRN    ----------------------------------------------------------------------------------------  PHYSICAL EXAM   Constitutional - NAD, easily arousable but falls asleep frequently during interview/exam.  pt needs frequent prompting to follow commands.  HEENT - NCAT, EOMI  Neck - Supple, No limited ROM  Chest - Breathing comfortably, No wheezing  Cardiovascular - S1S2   Abdomen - Soft   Extremities - No C/C/E, No calf tenderness   Neurologic Exam -                    Cognitive - fatigued; oriented to self, place, year     Communication - speech slurred     Cranial Nerves - CN 2-12 intact     Motor -                     LEFT    UE - ShAB 5/5, EF 5/5, EE 5/5, WE 5/5,  5/5                    RIGHT UE - ShAB 5/5, EF 5/5, EE 5/5, WE 5/5,  5/5                    LEFT    LE - HF 1/5, KE 5/5, DF 5/5, PF 5/5                    RIGHT LE - HF 1/5, KE 5/5, DF 5/5, PF 5/5        Sensory - Intact to LT     Reflexes - DTR Intact, No primitive reflexive     Psychiatric - Mood stable, Affect WNL  ----------------------------------------------------------------------------------------    ASSESSMENT/PLAN  69y Female with functional deficits after L2-5 lami/fusion on 5/8  Pain - Tylenol, flexeril, gabapentin, dilaudid, lidocaine patch.  Pain management consult.  Hypothyroid - Synthroid  Migraine - topamax, zomig  DVT PPX - SCDs  Rehab - Pt is on bedrest until 5/10.  She will likely require rehab services after discharge, but will defer recommendation until after seen by PT.  Will continue to follow for ongoing rehab needs and recommendations.

## 2019-05-09 NOTE — PROGRESS NOTE ADULT - SUBJECTIVE AND OBJECTIVE BOX
Subjective: No overnight events or new complaints. Patient on a PCA, which appears to be to strong as patient is having a hard time staying awake.     T(C): 36.8 (05-09-19 @ 09:00), Max: 37.9 (05-09-19 @ 08:00)  HR: 93 (05-09-19 @ 08:00) (66 - 93)  BP: 135/71 (05-09-19 @ 08:00) (119/74 - 152/54)  RR: 17 (05-09-19 @ 08:00) (8 - 18)  SpO2: 94% (05-09-19 @ 08:00) (94% - 100%)  Wt(kg): --    Exam: Sleeping, arousable, does not stay awake for exam. Needs alot of encouragement to stay awake & participate. Face symmetrical, EOMI, speech slow, slightly garbled at times & difficult to understand.     Right lower extremity KE 4/5, HF 4/5, DF +3/5, PF 4/5, EHL 4/5,   Left lower extremity   KE 4+/5, HF 4+/5, DF 4+/5, PF 4=/5, EHL 4+/5    CBC Full  -  ( 09 May 2019 08:49 )  WBC Count : 12.2 K/uL  RBC Count : 3.94 M/uL  Hemoglobin : 11.1 g/dL  Hematocrit : 34.8 %  Platelet Count - Automated : 191 K/uL  Mean Cell Volume : 88.3 fl  Mean Cell Hemoglobin : 28.2 pg  Mean Cell Hemoglobin Concentration : 31.9 g/dL  Auto Neutrophil # : 9.7 K/uL  Auto Lymphocyte # : 1.3 K/uL  Auto Monocyte # : 1.1 K/uL  Auto Eosinophil # : 0.1 K/uL  Auto Basophil # : 0.0 K/uL  Auto Neutrophil % : 79.7 %  Auto Lymphocyte % : 10.6 %  Auto Monocyte % : 8.7 %  Auto Eosinophil % : 0.7 %  Auto Basophil % : 0.1 %    05-09    140  |  109<H>  |  8.0  ----------------------------<  141<H>  3.2<L>   |  19.0<L>  |  0.64    Ca    7.5<L>      09 May 2019 08:49      Wound: Dressing intact with serosanguineous fluid underneath. Halo fluid on bedsheet    Assessment/Plan:  70 y/o female POD # 1 from L2-3, L3-4 Laminectomy, fusion , with revision of fusion L4-5  1. On PCA . Need pain management consult for management of PCA  2. Compression device while in bed  3. Potassium supplement  4. May elevate HOB for meals & than return to flat position   5. Cont drain care, record output

## 2019-05-09 NOTE — CONSULT NOTE ADULT - CONSULT REASON
70 y/o female with lumbar stenosis with neurogenic claudication, now POD #1 Lumbar fusion. She endured an intra-operative CSF leak, due to adherent dura at L3-4. She is on IV PCA for pain management. Surgical team requests assistance with regimen.

## 2019-05-10 DIAGNOSIS — G89.29 OTHER CHRONIC PAIN: ICD-10-CM

## 2019-05-10 LAB
ANION GAP SERPL CALC-SCNC: 9 MMOL/L — SIGNIFICANT CHANGE UP (ref 5–17)
BASOPHILS # BLD AUTO: 0 K/UL — SIGNIFICANT CHANGE UP (ref 0–0.2)
BASOPHILS NFR BLD AUTO: 0.2 % — SIGNIFICANT CHANGE UP (ref 0–2)
BUN SERPL-MCNC: 6 MG/DL — LOW (ref 8–20)
CALCIUM SERPL-MCNC: 8 MG/DL — LOW (ref 8.6–10.2)
CHLORIDE SERPL-SCNC: 111 MMOL/L — HIGH (ref 98–107)
CO2 SERPL-SCNC: 20 MMOL/L — LOW (ref 22–29)
CREAT SERPL-MCNC: 0.63 MG/DL — SIGNIFICANT CHANGE UP (ref 0.5–1.3)
EOSINOPHIL # BLD AUTO: 0.2 K/UL — SIGNIFICANT CHANGE UP (ref 0–0.5)
EOSINOPHIL NFR BLD AUTO: 1.8 % — SIGNIFICANT CHANGE UP (ref 0–6)
GLUCOSE SERPL-MCNC: 135 MG/DL — HIGH (ref 70–115)
HCT VFR BLD CALC: 33.2 % — LOW (ref 37–47)
HGB BLD-MCNC: 10.7 G/DL — LOW (ref 12–16)
LYMPHOCYTES # BLD AUTO: 1.7 K/UL — SIGNIFICANT CHANGE UP (ref 1–4.8)
LYMPHOCYTES # BLD AUTO: 14.1 % — LOW (ref 20–55)
MAGNESIUM SERPL-MCNC: 1.9 MG/DL — SIGNIFICANT CHANGE UP (ref 1.6–2.6)
MCHC RBC-ENTMCNC: 28.4 PG — SIGNIFICANT CHANGE UP (ref 27–31)
MCHC RBC-ENTMCNC: 32.2 G/DL — SIGNIFICANT CHANGE UP (ref 32–36)
MCV RBC AUTO: 88.1 FL — SIGNIFICANT CHANGE UP (ref 81–99)
MONOCYTES # BLD AUTO: 1 K/UL — HIGH (ref 0–0.8)
MONOCYTES NFR BLD AUTO: 8.2 % — SIGNIFICANT CHANGE UP (ref 3–10)
NEUTROPHILS # BLD AUTO: 9.1 K/UL — HIGH (ref 1.8–8)
NEUTROPHILS NFR BLD AUTO: 75.4 % — HIGH (ref 37–73)
PHOSPHATE SERPL-MCNC: 1.3 MG/DL — LOW (ref 2.4–4.7)
PLATELET # BLD AUTO: 173 K/UL — SIGNIFICANT CHANGE UP (ref 150–400)
POTASSIUM SERPL-MCNC: 3.3 MMOL/L — LOW (ref 3.5–5.3)
POTASSIUM SERPL-SCNC: 3.3 MMOL/L — LOW (ref 3.5–5.3)
RBC # BLD: 3.77 M/UL — LOW (ref 4.4–5.2)
RBC # FLD: 14.6 % — SIGNIFICANT CHANGE UP (ref 11–15.6)
SODIUM SERPL-SCNC: 140 MMOL/L — SIGNIFICANT CHANGE UP (ref 135–145)
WBC # BLD: 12.1 K/UL — HIGH (ref 4.8–10.8)
WBC # FLD AUTO: 12.1 K/UL — HIGH (ref 4.8–10.8)

## 2019-05-10 PROCEDURE — 99232 SBSQ HOSP IP/OBS MODERATE 35: CPT

## 2019-05-10 PROCEDURE — 72131 CT LUMBAR SPINE W/O DYE: CPT | Mod: 26

## 2019-05-10 RX ORDER — OXYCODONE HYDROCHLORIDE 5 MG/1
5 TABLET ORAL EVERY 4 HOURS
Refills: 0 | Status: DISCONTINUED | OUTPATIENT
Start: 2019-05-10 | End: 2019-05-14

## 2019-05-10 RX ORDER — OXYCODONE HYDROCHLORIDE 5 MG/1
10 TABLET ORAL EVERY 4 HOURS
Refills: 0 | Status: DISCONTINUED | OUTPATIENT
Start: 2019-05-10 | End: 2019-05-14

## 2019-05-10 RX ORDER — POTASSIUM PHOSPHATE, MONOBASIC POTASSIUM PHOSPHATE, DIBASIC 236; 224 MG/ML; MG/ML
30 INJECTION, SOLUTION INTRAVENOUS ONCE
Refills: 0 | Status: COMPLETED | OUTPATIENT
Start: 2019-05-10 | End: 2019-05-10

## 2019-05-10 RX ADMIN — Medication 50 MILLIGRAM(S): at 05:28

## 2019-05-10 RX ADMIN — CYCLOBENZAPRINE HYDROCHLORIDE 10 MILLIGRAM(S): 10 TABLET, FILM COATED ORAL at 13:02

## 2019-05-10 RX ADMIN — Medication 225 MILLIGRAM(S): at 13:01

## 2019-05-10 RX ADMIN — SODIUM CHLORIDE 3 MILLILITER(S): 9 INJECTION INTRAMUSCULAR; INTRAVENOUS; SUBCUTANEOUS at 12:55

## 2019-05-10 RX ADMIN — POTASSIUM PHOSPHATE, MONOBASIC POTASSIUM PHOSPHATE, DIBASIC 83.33 MILLIMOLE(S): 236; 224 INJECTION, SOLUTION INTRAVENOUS at 13:02

## 2019-05-10 RX ADMIN — GABAPENTIN 600 MILLIGRAM(S): 400 CAPSULE ORAL at 05:28

## 2019-05-10 RX ADMIN — Medication 1 TABLET(S): at 05:27

## 2019-05-10 RX ADMIN — Medication 75 MICROGRAM(S): at 05:28

## 2019-05-10 RX ADMIN — FAMOTIDINE 20 MILLIGRAM(S): 10 INJECTION INTRAVENOUS at 05:28

## 2019-05-10 RX ADMIN — SENNA PLUS 2 TABLET(S): 8.6 TABLET ORAL at 21:17

## 2019-05-10 RX ADMIN — SODIUM CHLORIDE 3 MILLILITER(S): 9 INJECTION INTRAMUSCULAR; INTRAVENOUS; SUBCUTANEOUS at 05:28

## 2019-05-10 RX ADMIN — Medication 240 MILLIGRAM(S): at 05:28

## 2019-05-10 RX ADMIN — CYCLOBENZAPRINE HYDROCHLORIDE 10 MILLIGRAM(S): 10 TABLET, FILM COATED ORAL at 05:28

## 2019-05-10 RX ADMIN — FAMOTIDINE 20 MILLIGRAM(S): 10 INJECTION INTRAVENOUS at 18:26

## 2019-05-10 RX ADMIN — Medication 2000 UNIT(S): at 13:02

## 2019-05-10 RX ADMIN — Medication 50 MILLIGRAM(S): at 18:26

## 2019-05-10 RX ADMIN — OXYCODONE HYDROCHLORIDE 10 MILLIGRAM(S): 5 TABLET ORAL at 14:32

## 2019-05-10 RX ADMIN — Medication 1 TABLET(S): at 18:26

## 2019-05-10 RX ADMIN — OXYCODONE HYDROCHLORIDE 5 MILLIGRAM(S): 5 TABLET ORAL at 19:20

## 2019-05-10 RX ADMIN — HYDROMORPHONE HYDROCHLORIDE 30 MILLILITER(S): 2 INJECTION INTRAMUSCULAR; INTRAVENOUS; SUBCUTANEOUS at 09:07

## 2019-05-10 RX ADMIN — OXYCODONE HYDROCHLORIDE 5 MILLIGRAM(S): 5 TABLET ORAL at 18:26

## 2019-05-10 RX ADMIN — SODIUM CHLORIDE 3 MILLILITER(S): 9 INJECTION INTRAMUSCULAR; INTRAVENOUS; SUBCUTANEOUS at 21:16

## 2019-05-10 RX ADMIN — GABAPENTIN 600 MILLIGRAM(S): 400 CAPSULE ORAL at 13:02

## 2019-05-10 RX ADMIN — RALOXIFENE HYDROCHLORIDE 60 MILLIGRAM(S): 60 TABLET, COATED ORAL at 13:02

## 2019-05-10 RX ADMIN — HYDROMORPHONE HYDROCHLORIDE 30 MILLILITER(S): 2 INJECTION INTRAMUSCULAR; INTRAVENOUS; SUBCUTANEOUS at 07:29

## 2019-05-10 RX ADMIN — Medication 100 MILLIGRAM(S): at 13:01

## 2019-05-10 RX ADMIN — OXYCODONE HYDROCHLORIDE 10 MILLIGRAM(S): 5 TABLET ORAL at 23:42

## 2019-05-10 RX ADMIN — Medication 100 MILLIGRAM(S): at 21:17

## 2019-05-10 RX ADMIN — CYCLOBENZAPRINE HYDROCHLORIDE 10 MILLIGRAM(S): 10 TABLET, FILM COATED ORAL at 21:17

## 2019-05-10 RX ADMIN — Medication 100 MILLIGRAM(S): at 05:28

## 2019-05-10 RX ADMIN — ATORVASTATIN CALCIUM 20 MILLIGRAM(S): 80 TABLET, FILM COATED ORAL at 21:17

## 2019-05-10 RX ADMIN — GABAPENTIN 600 MILLIGRAM(S): 400 CAPSULE ORAL at 21:17

## 2019-05-10 NOTE — PROGRESS NOTE ADULT - ASSESSMENT
69y Female PMH GERD, HLD, hypothyroid, IBS, migraine, history of lumbar fusion, now s/p L2-3, 3-4 laminectomy and fusion with extension/revision to L4-5 POD#2.  - Epidural drain w/ 70 cc output overnight, total 170 cc/24 hours  - Denies headache    PLAN:  - Will d/w attending  - CT lumbar spine pending  - Pain control PRN- PCA d/violette; avoiding narcotics for now to prevent oversedation and patient appears comfortable. Pain management following, note/recommendations appreciated  - Will begin elevating HOB today  - Continue epidural drain for now  - Diet as tolerated  - Continue jones for now  - Home meds continued  - Potassium remains low, phosphorus level checked, also low- 30 mmol KPhos ordered  - PT/OT evals in AM  - PM&R following, recs pending PT/OT evals 69y Female PMH GERD, HLD, hypothyroid, IBS, migraine, history of lumbar fusion, now s/p L2-3, 3-4 laminectomy and fusion with extension/revision to L4-5 POD#2.  - Epidural drain w/ 70 cc output overnight, total 170 cc/24 hours  - Denies headache    PLAN:  - Will d/w attending  - CT lumbar spine pending  - Pain control PRN- PCA d/violette; avoiding narcotics for now to prevent oversedation and patient appears comfortable. Pain management following, note/recommendations appreciated  - Will begin elevating HOB today  - D/c drain today  - Diet as tolerated  - Continue jones for now  - Home meds continued  - Potassium remains low, phosphorus level checked, also low- 30 mmol KPhos ordered  - PT/OT evals in AM  - PM&R following, recs pending PT/OT evals

## 2019-05-10 NOTE — CHART NOTE - NSCHARTNOTEFT_GEN_A_CORE
Patient more awake, complaining of severe pain w/ movement. Oxycodone 5 mg Q4 hour PRN for moderate pain, 10 mg Q4 hour for severe pain added.    Patient was ale in left lateral decubitus. Drain was taken off suction. Sterile dressing removed with minimal serosanguinous fluid on telfa noted. Incision clean, dry, intact without active drainage or dehiscence. Epidural GOOD drain removed slowly with minimal drainage from site. 1 suture already in place from OR was tied to close drain site.    Patient tolerated procedure well.

## 2019-05-10 NOTE — PROGRESS NOTE ADULT - SUBJECTIVE AND OBJECTIVE BOX
Chief Complaint: back pain    Patient seen and examined at bedside. This is a 69y Female PMH GERD, HLD, hypothyroid, IBS, migraine, history of lumbar fusion, who is now s/p L2-3, 3-4 laminectomy and fusion with extension/revision to L4-5 POD#2. Patient is AAOx 3 in bed. She does appear slightly drowsy, but is able to hold a full conversation with ease. She is reporting pain is overall stable with regimen. Pain is a 4/10 today. Denies side effects.     PAST MEDICAL & SURGICAL HISTORY:  Mariola-Cai tear: repaired 5/14/15  IBS (irritable bowel syndrome)  Raynaud disease  Colon cancer  GERD (gastroesophageal reflux disease)  Chronic pain  Migraine  Hypothyroid  Hyperlipidemia  S/P cholecystectomy  S/P colon resection  S/P lumbar fusion: 2000&#x27;s,  cervical discectomy 1980&#x27;s,  rotator cuff repair 1980&#x27;s,  ovarian cystectomy 1980&#x27;s,  arthroscopy right knee x2, left once in the 2000&#x27;s,  left lumpectomy 1980&#x27;s (benign)  S/P tonsillectomy: 1970&#x27;s      SOCIAL HISTORY:  [ ] Denies Smoking, Alcohol, or Drug Use    Allergies    Biaxin (Rash)  Claritin (Rash)    Intolerances        PAIN MEDICATIONS:  acetaminophen   Tablet .. 650 milliGRAM(s) Oral every 6 hours PRN  ALPRAZolam 0.5 milliGRAM(s) Oral three times a day PRN  cyclobenzaprine 10 milliGRAM(s) Oral every 8 hours  gabapentin 600 milliGRAM(s) Oral three times a day  ondansetron Injectable 4 milliGRAM(s) IV Push every 6 hours PRN  ondansetron Injectable 4 milliGRAM(s) IV Push every 4 hours PRN  topiramate 50 milliGRAM(s) Oral two times a day  venlafaxine XR. 225 milliGRAM(s) Oral daily  ZOLMitriptan 5 milliGRAM(s) Oral daily PRN    Heme:    Antibiotics:    Cardiac:  verapamil  milliGRAM(s) Oral daily    Pulmonary:    Endocrine  atorvastatin 20 milliGRAM(s) Oral at bedtime  levothyroxine 75 MICROGram(s) Oral daily    GI:  docusate sodium 100 milliGRAM(s) Oral three times a day  famotidine    Tablet 20 milliGRAM(s) Oral two times a day  magnesium hydroxide Suspension 30 milliLiter(s) Oral every 12 hours PRN  senna 2 Tablet(s) Oral at bedtime    All Other Meds:  calcium carbonate 1250 mG  + Vitamin D (OsCal 500 + D) 1 Tablet(s) Oral two times a day  cholecalciferol 2000 Unit(s) Oral daily  lidocaine   Patch 1 Patch Transdermal daily PRN  naloxone Injectable 0.1 milliGRAM(s) IV Push every 3 minutes PRN  potassium phosphate IVPB 30 milliMole(s) IV Intermittent once  raloxifene 60 milliGRAM(s) Oral daily  sodium chloride 0.9% lock flush 3 milliLiter(s) IV Push every 8 hours      REVIEW OF SYSTEMS:  CONSTITUTIONAL: Negative fever,chills  NECK: No pain or stiffness  RESPIRATORY: No cough, wheezing,  No shortness of breath  GASTROINTESTINAL: No abdominal, No nausea, vomiting; No diarrhea or constipation.   GENITOURINARY: No dysuria, frequency, or incontinence  NEUROLOGICAL: No headaches, memory loss, loss of strength, numbness, or  SKIN: No itching, burning, rashes, or lesions   MUSCULOSKELETAL: No joint pain or swelling; No muscle, back, or extremity pain    PHYSICAL EXAM:    Vital Signs Last 24 Hrs  T(C): 37.4 (10 May 2019 04:45), Max: 37.7 (09 May 2019 19:58)  T(F): 99.3 (10 May 2019 04:45), Max: 99.9 (09 May 2019 19:58)  HR: 90 (10 May 2019 04:45) (81 - 90)  BP: 127/65 (10 May 2019 04:45) (121/64 - 130/72)  BP(mean): --  RR: 18 (10 May 2019 04:45) (18 - 18)  SpO2: 98% (10 May 2019 04:45) (93% - 98%)    PAIN SCORE:     4    SCALE USED: (1-10 VNRS)       GENERAL: Comfortable, in no apparent distress  HEENT:  Atraumatic, Normocephalic, PERRL, EOMI  NECK: Supple  LUNG: Respiration even and unlabored, no distress noted  ABDOMEN: Soft, Nontender, Nondistended;   BACK: No midline bony tenderness to palpation, no step off or deformity noted, dressing C/D/I  EXTREMITIES:  CORDERO X 4; 2+ Peripheral Pulses, No clubbing, cyanosis, or edema  NEUROLOGIC: Awake, alert and oriented X3;  No focal deficits. Motor strength 5/5. Sensation intact to light touch  SKIN: Warm and Dry    LABS:                          10.7   12.1  )-----------( 173      ( 10 May 2019 07:57 )             33.2     05-10    140  |  111<H>  |  6.0<L>  ----------------------------<  135<H>  3.3<L>   |  20.0<L>  |  0.63    Ca    8.0<L>      10 May 2019 07:57  Phos  1.3     05-10  Mg     1.9     05-10            Drug Screen:        RADIOLOGY:      [ ]  COSTA  Reviewed and Copied to Chart

## 2019-05-10 NOTE — PROGRESS NOTE ADULT - SUBJECTIVE AND OBJECTIVE BOX
Patient in bed. Bed rest planned to end later in day.   Continues to report pain in the back.   Needed assistance with feeding as patient is unable to sit up.     FUNCTIONAL PROGRESS  Eval pending    REVIEW OF SYSTEMS  Constitutional - No fever,  +fatigue  Neurological - +loss of strength, +numbness   Musculoskeletal - +joint pain, +joint swelling, +muscle pain  Psychiatric - +depression, No anxiety    VITALS  T(C): 37.4 (05-10-19 @ 04:45), Max: 37.7 (05-09-19 @ 19:58)  HR: 90 (05-10-19 @ 04:45) (81 - 90)  BP: 127/65 (05-10-19 @ 04:45) (121/64 - 130/72)  RR: 18 (05-10-19 @ 04:45) (18 - 18)  SpO2: 98% (05-10-19 @ 04:45) (93% - 98%)  Wt(kg): --    MEDICATIONS   acetaminophen   Tablet .. 650 milliGRAM(s) every 6 hours PRN  ALPRAZolam 0.5 milliGRAM(s) three times a day PRN  atorvastatin 20 milliGRAM(s) at bedtime  calcium carbonate 1250 mG  + Vitamin D (OsCal 500 + D) 1 Tablet(s) two times a day  cholecalciferol 2000 Unit(s) daily  cyclobenzaprine 10 milliGRAM(s) every 8 hours  docusate sodium 100 milliGRAM(s) three times a day  famotidine    Tablet 20 milliGRAM(s) two times a day  gabapentin 600 milliGRAM(s) three times a day  levothyroxine 75 MICROGram(s) daily  lidocaine   Patch 1 Patch daily PRN  magnesium hydroxide Suspension 30 milliLiter(s) every 12 hours PRN  naloxone Injectable 0.1 milliGRAM(s) every 3 minutes PRN  ondansetron Injectable 4 milliGRAM(s) every 6 hours PRN  ondansetron Injectable 4 milliGRAM(s) every 4 hours PRN  potassium phosphate IVPB 30 milliMole(s) once  raloxifene 60 milliGRAM(s) daily  senna 2 Tablet(s) at bedtime  sodium chloride 0.9% lock flush 3 milliLiter(s) every 8 hours  topiramate 50 milliGRAM(s) two times a day  venlafaxine XR. 225 milliGRAM(s) daily  verapamil  milliGRAM(s) daily  ZOLMitriptan 5 milliGRAM(s) daily PRN      RECENT LABS - Reviewed                        10.7   12.1  )-----------( 173      ( 10 May 2019 07:57 )             33.2     05-10    140  |  111<H>  |  6.0<L>  ----------------------------<  135<H>  3.3<L>   |  20.0<L>  |  0.63    Ca    8.0<L>      10 May 2019 07:57  Phos  1.3     05-10  Mg     1.9     05-10              ----------------------------------------------------------------------------------------  PHYSICAL EXAM   Constitutional - NAD, Comfortable  Extremities - No swelling  Neurologic Exam -                    Cognitive - AAOx self, situation     Communication - Mildly dysarthric     Motor - Impaired coordination of the BUE - limited by fatigue                    LEFT    LE - HF 1/5, KE 5/5, DF 5/5, PF 5/5                    RIGHT LE - HF 1/5, KE 5/5, DF 5/5, PF 5/5        Sensory - Intact to LT     Reflexes - DTR Intact, No primitive reflexive     Psychiatric - Mood stable, Affect WNL  ----------------------------------------------------------------------------------------    ASSESSMENT/PLAN  69y Female with functional deficits after L2-5 lami/fusion on 5/8  Pain - Tylenol, Neurontin, Lidoderm  Constipation - Senna  Hypothyroid - Synthroid  Migraine - Topamax, Zomig  DVT PPX - SCDs  Rehab - Pt is on bedrest until 5/10.  Will continue to follow. Functional progress will determine ongoing rehab dispo recommendations, which may change.    Continue bedside therapy as well as OOB throughout the day with mobilization by staff to maintain cardiopulmonary function and prevention of secondary complications related to debility.

## 2019-05-10 NOTE — PROGRESS NOTE ADULT - PROBLEM SELECTOR PLAN 2
1. on Norco 60 mg daily as outpatient, all narcotics being held at this time due to neurosurgery recommendations  2. can consider as needed starting oxycodone 5 mg q 4 prn severe pain in the future- this will be the equivalent dosage of her baseline medications  3. will recommend continue with non narcotic analgesics for now per neurosurgery  4. continue tylenol, lidocaine, neurontin, flexeril as ordered  5. will continue to monitor.  6. call with questions [phone]

## 2019-05-10 NOTE — PROGRESS NOTE ADULT - SUBJECTIVE AND OBJECTIVE BOX
INTERVAL HPI/OVERNIGHT EVENTS:  69y Female PMH GERD, HLD, hypothyroid, IBS, migraine, history of lumbar fusion, now s/p L2-3, 3-4 laminectomy and fusion with extension/revision to L4-5 POD#2. Patient seen earlier this AM, drowsy, easily arousable but unable to stay awake for assessment. Patient now reassessed, still drowsy but more awake with constant stimulation with improved exam cooperation. Incisional pain "comes and goes." Tolerating diet. Denies headache, chest pain, SOB, abdominal pain.    Vital Signs Last 24 Hrs  T(C): 37.4 (10 May 2019 04:45), Max: 37.7 (09 May 2019 19:58)  T(F): 99.3 (10 May 2019 04:45), Max: 99.9 (09 May 2019 19:58)  HR: 90 (10 May 2019 04:45) (81 - 90)  BP: 127/65 (10 May 2019 04:45) (121/64 - 130/72)  BP(mean): --  RR: 18 (10 May 2019 04:45) (18 - 18)  SpO2: 98% (10 May 2019 04:45) (93% - 98%)    PHYSICAL EXAM:  GENERAL: NAD, well-groomed, well-developed  HEAD:  Atraumatic, normocephalic  MENTAL STATUS: Drowsy but easily arousable. Oriented to self, [facility], May 2019. Following commands  CRANIAL NERVES: PERRL. EOMI. Face symmetric w/ normal eye closure and smile, tongue midline. Hearing grossly intact. Dysarthric  MOTOR: strength 5/5 b/l upper extremities  Lowers      HF      KE        PF      DF    EHL  R             5/5     4+/5    4+/5   4/5   4+/5     L              5/5    5/5        5/5    5/5    5/5    SENSATION: grossly intact to light touch all extremities  CHEST/LUNG: Clear to auscultation bilaterally  HEART: +S1/+S2; Regular rate and rhythm  ABDOMEN: Soft, nontender, nondistended; decreased bowel sounds throughout  SKIN: Warm, dry    LABS:                        10.7   12.1  )-----------( 173      ( 10 May 2019 07:57 )             33.2     05-10    140  |  111<H>  |  6.0<L>  ----------------------------<  135<H>  3.3<L>   |  20.0<L>  |  0.63    Ca    8.0<L>      10 May 2019 07:57  Phos  1.3     05-10  Mg     1.9     05-10    05-09 @ 07:01  -  05-10 @ 07:00  --------------------------------------------------------  IN: 2300 mL / OUT: 1670 mL / NET: 630 mL INTERVAL HPI/OVERNIGHT EVENTS:  69y Female PMH GERD, HLD, hypothyroid, IBS, migraine, history of lumbar fusion, now s/p L2-3, 3-4 laminectomy and fusion with extension/revision to L4-5 POD#2. Patient seen earlier this AM, drowsy, easily arousable but unable to stay awake for assessment. Patient now reassessed, still drowsy but more awake with constant stimulation with improved exam cooperation. Incisional pain "comes and goes." RLE pain improved. Tolerating diet. Denies headache, chest pain, SOB, abdominal pain.    Vital Signs Last 24 Hrs  T(C): 37.4 (10 May 2019 04:45), Max: 37.7 (09 May 2019 19:58)  T(F): 99.3 (10 May 2019 04:45), Max: 99.9 (09 May 2019 19:58)  HR: 90 (10 May 2019 04:45) (81 - 90)  BP: 127/65 (10 May 2019 04:45) (121/64 - 130/72)  BP(mean): --  RR: 18 (10 May 2019 04:45) (18 - 18)  SpO2: 98% (10 May 2019 04:45) (93% - 98%)    PHYSICAL EXAM:  GENERAL: NAD, well-groomed, well-developed  HEAD:  Atraumatic, normocephalic  MENTAL STATUS: Drowsy but easily arousable. Oriented to self, [facility], May 2019. Following commands  CRANIAL NERVES: PERRL. EOMI. Face symmetric w/ normal eye closure and smile, tongue midline. Hearing grossly intact. Dysarthric  MOTOR: strength 5/5 b/l upper extremities  Lowers      HF      KE        PF      DF    EHL  R             5/5     4+/5    4+/5   4/5   4+/5     L              5/5    5/5        5/5    5/5    5/5    SENSATION: grossly intact to light touch all extremities  CHEST/LUNG: Clear to auscultation bilaterally  HEART: +S1/+S2; Regular rate and rhythm  ABDOMEN: Soft, nontender, nondistended; decreased bowel sounds throughout  SKIN: Warm, dry    LABS:                        10.7   12.1  )-----------( 173      ( 10 May 2019 07:57 )             33.2     05-10    140  |  111<H>  |  6.0<L>  ----------------------------<  135<H>  3.3<L>   |  20.0<L>  |  0.63    Ca    8.0<L>      10 May 2019 07:57  Phos  1.3     05-10  Mg     1.9     05-10    05-09 @ 07:01  -  05-10 @ 07:00  --------------------------------------------------------  IN: 2300 mL / OUT: 1670 mL / NET: 630 mL

## 2019-05-11 LAB
ANION GAP SERPL CALC-SCNC: 11 MMOL/L — SIGNIFICANT CHANGE UP (ref 5–17)
BASOPHILS # BLD AUTO: 0 K/UL — SIGNIFICANT CHANGE UP (ref 0–0.2)
BASOPHILS NFR BLD AUTO: 0.2 % — SIGNIFICANT CHANGE UP (ref 0–2)
BUN SERPL-MCNC: 11 MG/DL — SIGNIFICANT CHANGE UP (ref 8–20)
CALCIUM SERPL-MCNC: 8.2 MG/DL — LOW (ref 8.6–10.2)
CHLORIDE SERPL-SCNC: 109 MMOL/L — HIGH (ref 98–107)
CO2 SERPL-SCNC: 20 MMOL/L — LOW (ref 22–29)
CREAT SERPL-MCNC: 0.71 MG/DL — SIGNIFICANT CHANGE UP (ref 0.5–1.3)
EOSINOPHIL # BLD AUTO: 0.5 K/UL — SIGNIFICANT CHANGE UP (ref 0–0.5)
EOSINOPHIL NFR BLD AUTO: 4 % — SIGNIFICANT CHANGE UP (ref 0–6)
GLUCOSE SERPL-MCNC: 131 MG/DL — HIGH (ref 70–115)
HCT VFR BLD CALC: 31.9 % — LOW (ref 37–47)
HGB BLD-MCNC: 10.4 G/DL — LOW (ref 12–16)
LYMPHOCYTES # BLD AUTO: 16 % — LOW (ref 20–55)
LYMPHOCYTES # BLD AUTO: 2 K/UL — SIGNIFICANT CHANGE UP (ref 1–4.8)
MCHC RBC-ENTMCNC: 28.7 PG — SIGNIFICANT CHANGE UP (ref 27–31)
MCHC RBC-ENTMCNC: 32.6 G/DL — SIGNIFICANT CHANGE UP (ref 32–36)
MCV RBC AUTO: 87.9 FL — SIGNIFICANT CHANGE UP (ref 81–99)
MONOCYTES # BLD AUTO: 1.2 K/UL — HIGH (ref 0–0.8)
MONOCYTES NFR BLD AUTO: 9.4 % — SIGNIFICANT CHANGE UP (ref 3–10)
NEUTROPHILS # BLD AUTO: 8.6 K/UL — HIGH (ref 1.8–8)
NEUTROPHILS NFR BLD AUTO: 70.2 % — SIGNIFICANT CHANGE UP (ref 37–73)
PLATELET # BLD AUTO: 194 K/UL — SIGNIFICANT CHANGE UP (ref 150–400)
POTASSIUM SERPL-MCNC: 3.7 MMOL/L — SIGNIFICANT CHANGE UP (ref 3.5–5.3)
POTASSIUM SERPL-SCNC: 3.7 MMOL/L — SIGNIFICANT CHANGE UP (ref 3.5–5.3)
RBC # BLD: 3.63 M/UL — LOW (ref 4.4–5.2)
RBC # FLD: 14.7 % — SIGNIFICANT CHANGE UP (ref 11–15.6)
SODIUM SERPL-SCNC: 140 MMOL/L — SIGNIFICANT CHANGE UP (ref 135–145)
WBC # BLD: 12.2 K/UL — HIGH (ref 4.8–10.8)
WBC # FLD AUTO: 12.2 K/UL — HIGH (ref 4.8–10.8)

## 2019-05-11 RX ORDER — SODIUM CHLORIDE 9 MG/ML
1000 INJECTION INTRAMUSCULAR; INTRAVENOUS; SUBCUTANEOUS
Refills: 0 | Status: DISCONTINUED | OUTPATIENT
Start: 2019-05-11 | End: 2019-05-12

## 2019-05-11 RX ORDER — METHOCARBAMOL 500 MG/1
500 TABLET, FILM COATED ORAL EVERY 6 HOURS
Refills: 0 | Status: DISCONTINUED | OUTPATIENT
Start: 2019-05-11 | End: 2019-05-14

## 2019-05-11 RX ORDER — ACETAMINOPHEN 500 MG
650 TABLET ORAL EVERY 6 HOURS
Refills: 0 | Status: COMPLETED | OUTPATIENT
Start: 2019-05-11 | End: 2019-05-14

## 2019-05-11 RX ORDER — ENOXAPARIN SODIUM 100 MG/ML
40 INJECTION SUBCUTANEOUS DAILY
Refills: 0 | Status: DISCONTINUED | OUTPATIENT
Start: 2019-05-11 | End: 2019-05-14

## 2019-05-11 RX ADMIN — Medication 2000 UNIT(S): at 13:29

## 2019-05-11 RX ADMIN — GABAPENTIN 600 MILLIGRAM(S): 400 CAPSULE ORAL at 13:30

## 2019-05-11 RX ADMIN — OXYCODONE HYDROCHLORIDE 10 MILLIGRAM(S): 5 TABLET ORAL at 09:43

## 2019-05-11 RX ADMIN — Medication 100 MILLIGRAM(S): at 21:36

## 2019-05-11 RX ADMIN — SODIUM CHLORIDE 3 MILLILITER(S): 9 INJECTION INTRAMUSCULAR; INTRAVENOUS; SUBCUTANEOUS at 05:28

## 2019-05-11 RX ADMIN — Medication 650 MILLIGRAM(S): at 18:10

## 2019-05-11 RX ADMIN — SODIUM CHLORIDE 3 MILLILITER(S): 9 INJECTION INTRAMUSCULAR; INTRAVENOUS; SUBCUTANEOUS at 16:23

## 2019-05-11 RX ADMIN — SODIUM CHLORIDE 125 MILLILITER(S): 9 INJECTION INTRAMUSCULAR; INTRAVENOUS; SUBCUTANEOUS at 09:49

## 2019-05-11 RX ADMIN — Medication 225 MILLIGRAM(S): at 13:30

## 2019-05-11 RX ADMIN — OXYCODONE HYDROCHLORIDE 10 MILLIGRAM(S): 5 TABLET ORAL at 00:40

## 2019-05-11 RX ADMIN — Medication 240 MILLIGRAM(S): at 05:29

## 2019-05-11 RX ADMIN — Medication 100 MILLIGRAM(S): at 13:30

## 2019-05-11 RX ADMIN — Medication 50 MILLIGRAM(S): at 17:16

## 2019-05-11 RX ADMIN — ZOLMITRIPTAN 5 MILLIGRAM(S): 5 TABLET ORAL at 10:49

## 2019-05-11 RX ADMIN — SENNA PLUS 2 TABLET(S): 8.6 TABLET ORAL at 21:36

## 2019-05-11 RX ADMIN — OXYCODONE HYDROCHLORIDE 10 MILLIGRAM(S): 5 TABLET ORAL at 08:43

## 2019-05-11 RX ADMIN — Medication 650 MILLIGRAM(S): at 17:20

## 2019-05-11 RX ADMIN — GABAPENTIN 600 MILLIGRAM(S): 400 CAPSULE ORAL at 05:29

## 2019-05-11 RX ADMIN — Medication 50 MILLIGRAM(S): at 05:29

## 2019-05-11 RX ADMIN — FAMOTIDINE 20 MILLIGRAM(S): 10 INJECTION INTRAVENOUS at 05:29

## 2019-05-11 RX ADMIN — METHOCARBAMOL 500 MILLIGRAM(S): 500 TABLET, FILM COATED ORAL at 18:04

## 2019-05-11 RX ADMIN — Medication 75 MICROGRAM(S): at 05:29

## 2019-05-11 RX ADMIN — CYCLOBENZAPRINE HYDROCHLORIDE 10 MILLIGRAM(S): 10 TABLET, FILM COATED ORAL at 05:29

## 2019-05-11 RX ADMIN — RALOXIFENE HYDROCHLORIDE 60 MILLIGRAM(S): 60 TABLET, COATED ORAL at 13:29

## 2019-05-11 RX ADMIN — ZOLMITRIPTAN 5 MILLIGRAM(S): 5 TABLET ORAL at 09:49

## 2019-05-11 RX ADMIN — Medication 1 TABLET(S): at 05:29

## 2019-05-11 RX ADMIN — GABAPENTIN 600 MILLIGRAM(S): 400 CAPSULE ORAL at 21:36

## 2019-05-11 RX ADMIN — Medication 1 TABLET(S): at 17:16

## 2019-05-11 RX ADMIN — CYCLOBENZAPRINE HYDROCHLORIDE 10 MILLIGRAM(S): 10 TABLET, FILM COATED ORAL at 13:30

## 2019-05-11 RX ADMIN — ATORVASTATIN CALCIUM 20 MILLIGRAM(S): 80 TABLET, FILM COATED ORAL at 21:36

## 2019-05-11 RX ADMIN — SODIUM CHLORIDE 3 MILLILITER(S): 9 INJECTION INTRAMUSCULAR; INTRAVENOUS; SUBCUTANEOUS at 21:32

## 2019-05-11 RX ADMIN — ENOXAPARIN SODIUM 40 MILLIGRAM(S): 100 INJECTION SUBCUTANEOUS at 18:04

## 2019-05-11 RX ADMIN — FAMOTIDINE 20 MILLIGRAM(S): 10 INJECTION INTRAVENOUS at 17:16

## 2019-05-11 RX ADMIN — Medication 100 MILLIGRAM(S): at 05:29

## 2019-05-11 NOTE — PROGRESS NOTE ADULT - SUBJECTIVE AND OBJECTIVE BOX
Patient is a 69y old  Female who presents with a chief complaint of s/p L2-4 lami/fusion, revision fusion L45 (11 May 2019 09:22)  She is now POD #3 Lumbar fusion s/p IV PCA on 05/10. She used 3 doses of Oxycodone IR 10mg since.    VERBAL REPORT: "When I try to move, it hurts"  Patient describes post-surgical back pain; stiffness with movement. She is uncertain if Oxycodone is helping  PAIN SCORE: 5-6/10  (VNRS)    MEDICATIONS  (STANDING):  atorvastatin 20 milliGRAM(s) Oral at bedtime  calcium carbonate 1250 mG  + Vitamin D (OsCal 500 + D) 1 Tablet(s) Oral two times a day  cholecalciferol 2000 Unit(s) Oral daily  cyclobenzaprine 10 milliGRAM(s) Oral every 8 hours  docusate sodium 100 milliGRAM(s) Oral three times a day  enoxaparin Injectable 40 milliGRAM(s) SubCutaneous daily  famotidine    Tablet 20 milliGRAM(s) Oral two times a day  gabapentin 600 milliGRAM(s) Oral three times a day  levothyroxine 75 MICROGram(s) Oral daily  raloxifene 60 milliGRAM(s) Oral daily  senna 2 Tablet(s) Oral at bedtime  sodium chloride 0.9% lock flush 3 milliLiter(s) IV Push every 8 hours  sodium chloride 0.9%. 1000 milliLiter(s) (125 mL/Hr) IV Continuous <Continuous>  topiramate 50 milliGRAM(s) Oral two times a day  venlafaxine XR. 225 milliGRAM(s) Oral daily  verapamil  milliGRAM(s) Oral daily    MEDICATIONS  (PRN):  acetaminophen   Tablet .. 650 milliGRAM(s) Oral every 6 hours PRN Temp greater or equal to 38C (100.4F), Mild Pain (1 - 3)  ALPRAZolam 0.5 milliGRAM(s) Oral three times a day PRN for anxiety  lidocaine   Patch 1 Patch Transdermal daily PRN pain, 12 hrs on/ 12 hrs off  magnesium hydroxide Suspension 30 milliLiter(s) Oral every 12 hours PRN Constipation  naloxone Injectable 0.1 milliGRAM(s) IV Push every 3 minutes PRN For ANY of the following changes in patient status:  A. RR LESS THAN 10 breaths per minute, B. Oxygen saturation LESS THAN 90%, C. Sedation score of 6  ondansetron Injectable 4 milliGRAM(s) IV Push every 6 hours PRN Nausea  ondansetron Injectable 4 milliGRAM(s) IV Push every 4 hours PRN Nausea  oxyCODONE    IR 5 milliGRAM(s) Oral every 4 hours PRN Moderate Pain (4 - 6)  oxyCODONE    IR 10 milliGRAM(s) Oral every 4 hours PRN Severe Pain (7 - 10)  ZOLMitriptan 5 milliGRAM(s) Oral daily PRN Migraine      PHYSICAL EXAM:  Awake, supine in bed, oriented x3.       T(F): , Max: 101 (16:02)  HR:  (76 - 88)  BP:  (109/65 - 142/75)  RR:  (18 - 20)  SpO2:  (94% - 97%)      Pain Service  Text page via [employer]  PIN: [phone]

## 2019-05-11 NOTE — PROGRESS NOTE ADULT - PROBLEM SELECTOR PLAN 2
1. Maximize use of non-opioids  - Continue Gabapentin and Lidoderm as ordered  - Offer Tylenol q6hrs ATC x3 days, PRN thereafter

## 2019-05-11 NOTE — PROGRESS NOTE ADULT - ASSESSMENT
69y old Female now POD #3 Lumbar fusion s/p IV PCA on 05/10. She used 3 doses of Oxycodone IR 10mg since. She is more alert today than prior visit. Tolerating diet. Discussed trying an alternate muscle relaxant. Patient agrees.

## 2019-05-11 NOTE — PROGRESS NOTE ADULT - SUBJECTIVE AND OBJECTIVE BOX
NS PA Note  POD#3    Patient seen bedside. Yesterday drain was removed and patient was allowed to start to sit up. Overnight, she seemed to do well, but this morning on arrival to see her she was complaining of a headache behind her left eye. Patient was laying at about 30 degrees at the time.   VSS, afebrile  Neuro- Awakes to name but sleepy, orientedx3  NAD, speech mildly dysarthric but appropriate  follows commands  CORDERO well in bed  sensate intact to LT  Incision- CDI, flat, no fluctuance, no erythema, dressing without strikethrough  Plan:  NS stable  Patient w/ headache, seemingly positional during testing, although patient does suffer from migraines. Patient reported the headache feeling worse when at 90 degrees and "nearly gone" when flat. Maintain jones for now, restart fluids and keep flat. Give migraine medicine. Will re-eval patient on rounds with Dr. Galeas.   Discussed with nursing.   CW pain regimen  Hold PT for now.   Dr. Galeas to see. 11/20 Lap right colectomy

## 2019-05-12 RX ADMIN — FAMOTIDINE 20 MILLIGRAM(S): 10 INJECTION INTRAVENOUS at 05:38

## 2019-05-12 RX ADMIN — METHOCARBAMOL 500 MILLIGRAM(S): 500 TABLET, FILM COATED ORAL at 13:34

## 2019-05-12 RX ADMIN — METHOCARBAMOL 500 MILLIGRAM(S): 500 TABLET, FILM COATED ORAL at 17:47

## 2019-05-12 RX ADMIN — Medication 650 MILLIGRAM(S): at 13:34

## 2019-05-12 RX ADMIN — Medication 650 MILLIGRAM(S): at 01:30

## 2019-05-12 RX ADMIN — Medication 75 MICROGRAM(S): at 05:38

## 2019-05-12 RX ADMIN — Medication 225 MILLIGRAM(S): at 13:36

## 2019-05-12 RX ADMIN — Medication 650 MILLIGRAM(S): at 18:47

## 2019-05-12 RX ADMIN — GABAPENTIN 600 MILLIGRAM(S): 400 CAPSULE ORAL at 13:35

## 2019-05-12 RX ADMIN — ATORVASTATIN CALCIUM 20 MILLIGRAM(S): 80 TABLET, FILM COATED ORAL at 21:49

## 2019-05-12 RX ADMIN — METHOCARBAMOL 500 MILLIGRAM(S): 500 TABLET, FILM COATED ORAL at 23:33

## 2019-05-12 RX ADMIN — Medication 240 MILLIGRAM(S): at 05:38

## 2019-05-12 RX ADMIN — Medication 50 MILLIGRAM(S): at 17:47

## 2019-05-12 RX ADMIN — SODIUM CHLORIDE 3 MILLILITER(S): 9 INJECTION INTRAMUSCULAR; INTRAVENOUS; SUBCUTANEOUS at 21:50

## 2019-05-12 RX ADMIN — Medication 650 MILLIGRAM(S): at 23:32

## 2019-05-12 RX ADMIN — OXYCODONE HYDROCHLORIDE 10 MILLIGRAM(S): 5 TABLET ORAL at 23:51

## 2019-05-12 RX ADMIN — Medication 2000 UNIT(S): at 13:35

## 2019-05-12 RX ADMIN — Medication 650 MILLIGRAM(S): at 05:38

## 2019-05-12 RX ADMIN — ENOXAPARIN SODIUM 40 MILLIGRAM(S): 100 INJECTION SUBCUTANEOUS at 13:36

## 2019-05-12 RX ADMIN — SODIUM CHLORIDE 3 MILLILITER(S): 9 INJECTION INTRAMUSCULAR; INTRAVENOUS; SUBCUTANEOUS at 15:32

## 2019-05-12 RX ADMIN — OXYCODONE HYDROCHLORIDE 10 MILLIGRAM(S): 5 TABLET ORAL at 20:30

## 2019-05-12 RX ADMIN — METHOCARBAMOL 500 MILLIGRAM(S): 500 TABLET, FILM COATED ORAL at 05:38

## 2019-05-12 RX ADMIN — Medication 650 MILLIGRAM(S): at 00:34

## 2019-05-12 RX ADMIN — GABAPENTIN 600 MILLIGRAM(S): 400 CAPSULE ORAL at 05:38

## 2019-05-12 RX ADMIN — Medication 50 MILLIGRAM(S): at 05:38

## 2019-05-12 RX ADMIN — METHOCARBAMOL 500 MILLIGRAM(S): 500 TABLET, FILM COATED ORAL at 00:35

## 2019-05-12 RX ADMIN — Medication 1 TABLET(S): at 05:38

## 2019-05-12 RX ADMIN — SODIUM CHLORIDE 3 MILLILITER(S): 9 INJECTION INTRAMUSCULAR; INTRAVENOUS; SUBCUTANEOUS at 05:39

## 2019-05-12 RX ADMIN — Medication 650 MILLIGRAM(S): at 14:34

## 2019-05-12 RX ADMIN — Medication 1 TABLET(S): at 17:47

## 2019-05-12 RX ADMIN — OXYCODONE HYDROCHLORIDE 10 MILLIGRAM(S): 5 TABLET ORAL at 19:46

## 2019-05-12 RX ADMIN — Medication 100 MILLIGRAM(S): at 05:38

## 2019-05-12 RX ADMIN — RALOXIFENE HYDROCHLORIDE 60 MILLIGRAM(S): 60 TABLET, COATED ORAL at 13:36

## 2019-05-12 RX ADMIN — GABAPENTIN 600 MILLIGRAM(S): 400 CAPSULE ORAL at 21:49

## 2019-05-12 RX ADMIN — Medication 650 MILLIGRAM(S): at 17:47

## 2019-05-12 RX ADMIN — OXYCODONE HYDROCHLORIDE 10 MILLIGRAM(S): 5 TABLET ORAL at 13:34

## 2019-05-12 RX ADMIN — OXYCODONE HYDROCHLORIDE 10 MILLIGRAM(S): 5 TABLET ORAL at 14:34

## 2019-05-12 RX ADMIN — FAMOTIDINE 20 MILLIGRAM(S): 10 INJECTION INTRAVENOUS at 17:47

## 2019-05-12 NOTE — PROGRESS NOTE ADULT - SUBJECTIVE AND OBJECTIVE BOX
INTERVAL HPI/OVERNIGHT EVENTS:  69y Female PMH GERD, HLD, hypothyroid, IBS, migraine, history of lumbar fusion, now s/p L2-3, 3-4 laminectomy and fusion with extension/revision to L4-5 POD#4. Patient doing well today. States her pain is improved. Denies headache. Tolerating diet.    Vital Signs Last 24 Hrs  T(C): 37.1 (12 May 2019 05:16), Max: 38.3 (11 May 2019 16:02)  T(F): 98.8 (12 May 2019 05:16), Max: 101 (11 May 2019 16:02)  HR: 71 (12 May 2019 05:16) (71 - 85)  BP: 104/63 (12 May 2019 05:16) (104/63 - 130/65)  BP(mean): --  RR: 18 (12 May 2019 05:16) (18 - 18)  SpO2: 98% (12 May 2019 05:16) (94% - 98%)    PHYSICAL EXAM:  GENERAL: NAD, well-groomed, well-developed  HEAD:  Atraumatic, normocephalic  MENTAL STATUS: Awake, alert, oriented to self, [facility], May 2019. Appropriately conversant without aphasia. Following commands  CRANIAL NERVES: PERRL. EOMI. Face symmetric w/ normal eye closure and smile, tongue midline. Hearing grossly intact. Speech clear  MOTOR: strength 5/5 b/l upper extremities  Lowers      HF      KE        PF      DF    EHL  R             5/5     4+/5    4+/5    4/5    4/5     L              5/5    5/5        5/5    5/5    5/5    SENSATION: grossly intact to light touch all extremities  CHEST/LUNG: Clear to auscultation bilaterally  SPINE: Dressing removed with some serosanguinous drainage, no active drainage appreciated, incision clean dry intact  HEART: Regular rate  ABDOMEN: Soft, nontender, nondistended  SKIN: Warm, dry  EXTREMITIES: No calf tenderness b/l    LABS:                        10.4   12.2  )-----------( 194      ( 11 May 2019 07:41 )             31.9     05-11    140  |  109<H>  |  11.0  ----------------------------<  131<H>  3.7   |  20.0<L>  |  0.71    Ca    8.2<L>      11 May 2019 07:41    05-11 @ 07:01  -  05-12 @ 07:00  --------------------------------------------------------  IN: 1500 mL / OUT: 2150 mL / NET: -650 mL    RADIOLOGY & ADDITIONAL TESTS:  CT Lumbar Spine No Cont (05.10.19 @ 18:00)  There is mild mid lumbar levoscoliosis. There is no compression deformity   or osseous destruction. Long-standing posterior fusion of L4-5 and L5-S1   has been extended cranially to the L2 level with posterior plate and   screw fixation hardware. The hardware is grossly intact. There is minimal   anterolisthesis at L3-L4.There is ankylosis related to ventral   syndesmophytes at T11-12 with partial ankylosis at T12-L1 and L1-L2 with   relative preservation of the disc spaces. There are laminectomy defects,   new extending from L1 through L3 superimposed on old or defects caudally.   There is severe multilevel facet arthropathy notable for mild to moderate   residual spinal stenosis at L3-L4. Recent postoperative gas extends   dorsally to the skin surface without identified epidural collection.  IMPRESSION:  Immediate postoperative findings as above

## 2019-05-12 NOTE — PROGRESS NOTE ADULT - ASSESSMENT
69y Female PMH GERD, HLD, hypothyroid, IBS, migraine, history of lumbar fusion, now s/p L2-3, 3-4 laminectomy and fusion with extension/revision to L4-5 POD#4  - New dressing placed over incision- no active drainage from incision site appreciated  - Denies headache. Feeling better today. Pain improved    PLAN:  - Will d/w attending  - Pain control PRN, pain management following, appreciate recs, doing well w/ addition of robaxin  - Diet as tolerated  - PT eval today, increase activity as tolerated  - D/c robert, d/c IVF, TOV today. Will encourage PO fluids  - PM&R following, recs pending PT eval  - Lovenox & SCDs for DVT ppx  - Dispo pending PT/OT/PM&R recs

## 2019-05-13 LAB
ANION GAP SERPL CALC-SCNC: 10 MMOL/L — SIGNIFICANT CHANGE UP (ref 5–17)
BUN SERPL-MCNC: 7 MG/DL — LOW (ref 8–20)
CALCIUM SERPL-MCNC: 8.4 MG/DL — LOW (ref 8.6–10.2)
CHLORIDE SERPL-SCNC: 111 MMOL/L — HIGH (ref 98–107)
CO2 SERPL-SCNC: 22 MMOL/L — SIGNIFICANT CHANGE UP (ref 22–29)
CREAT SERPL-MCNC: 0.68 MG/DL — SIGNIFICANT CHANGE UP (ref 0.5–1.3)
CULTURE RESULTS: SIGNIFICANT CHANGE UP
CULTURE RESULTS: SIGNIFICANT CHANGE UP
GLUCOSE SERPL-MCNC: 117 MG/DL — HIGH (ref 70–115)
HCT VFR BLD CALC: 29.1 % — LOW (ref 37–47)
HGB BLD-MCNC: 9.4 G/DL — LOW (ref 12–16)
MAGNESIUM SERPL-MCNC: 2 MG/DL — SIGNIFICANT CHANGE UP (ref 1.6–2.6)
MCHC RBC-ENTMCNC: 28.1 PG — SIGNIFICANT CHANGE UP (ref 27–31)
MCHC RBC-ENTMCNC: 32.3 G/DL — SIGNIFICANT CHANGE UP (ref 32–36)
MCV RBC AUTO: 87.1 FL — SIGNIFICANT CHANGE UP (ref 81–99)
PHOSPHATE SERPL-MCNC: 2.5 MG/DL — SIGNIFICANT CHANGE UP (ref 2.4–4.7)
PLATELET # BLD AUTO: 263 K/UL — SIGNIFICANT CHANGE UP (ref 150–400)
POTASSIUM SERPL-MCNC: 3.4 MMOL/L — LOW (ref 3.5–5.3)
POTASSIUM SERPL-SCNC: 3.4 MMOL/L — LOW (ref 3.5–5.3)
RBC # BLD: 3.34 M/UL — LOW (ref 4.4–5.2)
RBC # FLD: 14.4 % — SIGNIFICANT CHANGE UP (ref 11–15.6)
SODIUM SERPL-SCNC: 143 MMOL/L — SIGNIFICANT CHANGE UP (ref 135–145)
SPECIMEN SOURCE: SIGNIFICANT CHANGE UP
SPECIMEN SOURCE: SIGNIFICANT CHANGE UP
WBC # BLD: 8.2 K/UL — SIGNIFICANT CHANGE UP (ref 4.8–10.8)
WBC # FLD AUTO: 8.2 K/UL — SIGNIFICANT CHANGE UP (ref 4.8–10.8)

## 2019-05-13 PROCEDURE — 99233 SBSQ HOSP IP/OBS HIGH 50: CPT

## 2019-05-13 RX ORDER — ASCORBIC ACID 60 MG
500 TABLET,CHEWABLE ORAL DAILY
Refills: 0 | Status: DISCONTINUED | OUTPATIENT
Start: 2019-05-13 | End: 2019-05-14

## 2019-05-13 RX ADMIN — Medication 225 MILLIGRAM(S): at 11:58

## 2019-05-13 RX ADMIN — RALOXIFENE HYDROCHLORIDE 60 MILLIGRAM(S): 60 TABLET, COATED ORAL at 11:59

## 2019-05-13 RX ADMIN — SODIUM CHLORIDE 3 MILLILITER(S): 9 INJECTION INTRAMUSCULAR; INTRAVENOUS; SUBCUTANEOUS at 13:14

## 2019-05-13 RX ADMIN — METHOCARBAMOL 500 MILLIGRAM(S): 500 TABLET, FILM COATED ORAL at 23:27

## 2019-05-13 RX ADMIN — Medication 650 MILLIGRAM(S): at 00:15

## 2019-05-13 RX ADMIN — Medication 650 MILLIGRAM(S): at 23:27

## 2019-05-13 RX ADMIN — Medication 650 MILLIGRAM(S): at 07:24

## 2019-05-13 RX ADMIN — METHOCARBAMOL 500 MILLIGRAM(S): 500 TABLET, FILM COATED ORAL at 18:04

## 2019-05-13 RX ADMIN — Medication 650 MILLIGRAM(S): at 12:31

## 2019-05-13 RX ADMIN — Medication 500 MILLIGRAM(S): at 11:59

## 2019-05-13 RX ADMIN — Medication 50 MILLIGRAM(S): at 18:04

## 2019-05-13 RX ADMIN — METHOCARBAMOL 500 MILLIGRAM(S): 500 TABLET, FILM COATED ORAL at 06:32

## 2019-05-13 RX ADMIN — Medication 1 TABLET(S): at 06:33

## 2019-05-13 RX ADMIN — GABAPENTIN 600 MILLIGRAM(S): 400 CAPSULE ORAL at 06:32

## 2019-05-13 RX ADMIN — ATORVASTATIN CALCIUM 20 MILLIGRAM(S): 80 TABLET, FILM COATED ORAL at 23:28

## 2019-05-13 RX ADMIN — Medication 75 MICROGRAM(S): at 06:32

## 2019-05-13 RX ADMIN — ENOXAPARIN SODIUM 40 MILLIGRAM(S): 100 INJECTION SUBCUTANEOUS at 11:58

## 2019-05-13 RX ADMIN — Medication 650 MILLIGRAM(S): at 18:40

## 2019-05-13 RX ADMIN — Medication 650 MILLIGRAM(S): at 06:32

## 2019-05-13 RX ADMIN — METHOCARBAMOL 500 MILLIGRAM(S): 500 TABLET, FILM COATED ORAL at 11:58

## 2019-05-13 RX ADMIN — Medication 50 MILLIGRAM(S): at 06:32

## 2019-05-13 RX ADMIN — Medication 1 TABLET(S): at 18:04

## 2019-05-13 RX ADMIN — Medication 650 MILLIGRAM(S): at 11:59

## 2019-05-13 RX ADMIN — GABAPENTIN 600 MILLIGRAM(S): 400 CAPSULE ORAL at 13:36

## 2019-05-13 RX ADMIN — Medication 650 MILLIGRAM(S): at 18:04

## 2019-05-13 RX ADMIN — GABAPENTIN 600 MILLIGRAM(S): 400 CAPSULE ORAL at 23:27

## 2019-05-13 RX ADMIN — SODIUM CHLORIDE 3 MILLILITER(S): 9 INJECTION INTRAMUSCULAR; INTRAVENOUS; SUBCUTANEOUS at 06:35

## 2019-05-13 RX ADMIN — SODIUM CHLORIDE 3 MILLILITER(S): 9 INJECTION INTRAMUSCULAR; INTRAVENOUS; SUBCUTANEOUS at 23:25

## 2019-05-13 RX ADMIN — Medication 2000 UNIT(S): at 11:59

## 2019-05-13 RX ADMIN — OXYCODONE HYDROCHLORIDE 10 MILLIGRAM(S): 5 TABLET ORAL at 00:30

## 2019-05-13 RX ADMIN — Medication 240 MILLIGRAM(S): at 06:32

## 2019-05-13 RX ADMIN — FAMOTIDINE 20 MILLIGRAM(S): 10 INJECTION INTRAVENOUS at 06:32

## 2019-05-13 RX ADMIN — FAMOTIDINE 20 MILLIGRAM(S): 10 INJECTION INTRAVENOUS at 18:04

## 2019-05-13 NOTE — OCCUPATIONAL THERAPY INITIAL EVALUATION ADULT - ADDITIONAL COMMENTS
Pt lives in private home, 2 OMAR, no stairs inside.    able to assist.   Stall shower, standard toilet.   Owns RW, cane, crutches, wheelchair, shower chair.

## 2019-05-13 NOTE — PROGRESS NOTE ADULT - PROBLEM SELECTOR PLAN 1
REC:  1. continue prn oxycodone 5/10 mg q4 (modest utilization)  2. continue robaxin 500 mg q6h prn (using apporx 3/d).  3. continue gabapentin 600 mg tid

## 2019-05-13 NOTE — DIETITIAN INITIAL EVALUATION ADULT. - OTHER INFO
69y Female PMH GERD, HLD, hypothyroid, IBS, migraine, history of lumbar fusion, now s/p L2-3, 3-4 laminectomy and fusion with extension/revision to L4-5 POD#5. Pt reports prolonged decreased appetite/po intake over the last 9 months with subsequent weight loss of ~25 lbs during this time. Breakfast tray at bedside, pt did not consume yet- states not much desire to. Follows a regular diet at home. Denies chewing/swallowing difficulty. Noted with 1+ BL foot edema.

## 2019-05-13 NOTE — PROGRESS NOTE ADULT - SUBJECTIVE AND OBJECTIVE BOX
s/p L2-4 lumbar lami + fusion and L4/5 revision 5/8/19.  This am patient is seen in hospital bed; wearing thoraco-lumbar brace (loosely).  Reports did get OOB yesterday and did some walking with walker and right foot AFO.  Denies bowel bladder incontinence.  Had flexeril rotated to robaxin (approx 3/d) 5/11/19.  Has continued gabapentin 600 mg tid.     PE;  VSS, afebrile.  A little drowsy this am.  Reports pain at surgical site (site not evaluated, defer to surgery).  Wearing T/L brace.      ASSESS:  1. POD# 5 s/p extensive lumbar fusion; recovering as well as to be expected  2. Prior low dose opioid prn, less than 50 mg MS equiv/d (hydrocodone 7.5 qid prn).      REC:  1. continue prn oxycodone 5/10 mg q4 (modest utilization)  2. continue robaxin 500 mg q6h prn (using apporx 3/d).  3. continue gabapentin 600 mg tid

## 2019-05-13 NOTE — PHYSICAL THERAPY INITIAL EVALUATION ADULT - ACTIVE RANGE OF MOTION EXAMINATION, REHAB EVAL
bilateral  lower extremity Active ROM was WFL (within functional limits)/vahe. upper extremity Active ROM was WNL (within normal limits)

## 2019-05-13 NOTE — PHYSICAL THERAPY INITIAL EVALUATION ADULT - ADDITIONAL COMMENTS
Pt lives in a house with 2 steps to enter with 0 rails and 0 stairs inside.   Pt owns medical equipment: SAC, RW, Commode, Shower Chair   Pt lives with: Spouse: Spouse is available to provide 24hr care

## 2019-05-13 NOTE — DIETITIAN INITIAL EVALUATION ADULT. - PERTINENT MEDS FT
Robaxin, Oxycodone, Xanax, Lipitor, Calcium carbonate + vitamin D, Pepcid, Synthroid, Zofran, Topamax, Zomig, Raloxifene

## 2019-05-13 NOTE — PROGRESS NOTE ADULT - SUBJECTIVE AND OBJECTIVE BOX
Patient lethargic.  Opens her eyes briefly. States hello.  Limited participation in exam.   Reports no pain.   Earlier worked with OT.     Medically drain was removed on 5/10. On 5/11 was complaining of posterior left eye pain while sitting up 30 deg.   Headaches seems to have resolved. This AM also does not endorse one.     FUNCTIONAL PROGRESS  5/13  Bed Mobility: Rolling/Turning:     · Level of [place]	minimum assist (75% patients effort); Log roll	  · Physical Assist/Nonphysical Assist	verbal cues; 1 person assist	  · Assistive Device	bed rails	    Bed Mobility: Sit to Supine:     · Level of [place]	minimum assist (75% patients effort)	  · Physical Assist/Nonphysical Assist	verbal cues; 1 person assist	  · Assistive Device	bed rails	    Bed Mobility Analysis:     · Bed Mobility Limitations	Spinal precautions	  · Impairments Contributing to Impaired Bed Mobility	pain	    Transfer: Bed to Chair:    Bed to Chair Transfer:    Transfer Skill: Bed to Chair   · Level of [place]	contact guard	  · Physical Assist/Nonphysical Assist	verbal cues; 1 person assist	  · Weight-Bearing Restrictions	full weight-bearing	  · Assistive Device	rolling walker; LSO	    Bed to Chair Safety Analysis:     · Impairments Contributing to Impaired Transfers	pain	    Transfer: Sit to Stand:     · Level of [place]	contact guard	  · Physical Assist/Nonphysical Assist	verbal cues; 1 person assist	  · Weight-Bearing Restrictions	full weight-bearing	  · Assistive Device	rolling walker; LSO	    Transfer: Stand to Sit:     · Level of [place]	contact guard	  · Physical Assist/Nonphysical Assist	verbal cues; 1 person assist	  · Weight-Bearing Restrictions	full weight-bearing	  · Assistive Device	rolling walker; LSO	    Sit/Stand Transfer Safety Analysis:     · Impairments Contributing to Impaired Transfers	pain	    Transfer: Toilet Transfer:     · Level of [place]	contact guard	  · Physical Assist/Nonphysical Assist	verbal cues; 1 person assist	  · Weight-Bearing Restrictions	full weight-bearing	  · Assistive Device	commode; grab bars; rolling walker; LSO	    Upper Body Dressing Training:     · Level of [place]	contact guard; LSO	  · Physical Assist/Nonphysical Assist	verbal cues; 1 person assist	  · Assistive Device	 able to assist.	    Lower Body Dressing Training:     · Level of [place]	dependent (less than 25% patients effort); 2 spinal precautions	  · Physical Assist/Nonphysical Assist	verbal cues; 1 person assist	  · Assistive Device	 able to assist.	    Toilet Hygiene Training:     · Level of [place]	supervision	  · Physical Assist/Nonphysical Assist	supervision; verbal cues	  · Assistive Device	commode; grab bars; rolling walker	    Grooming Training:     · Level of [place]	independent	    Eating/Self-Feeding Training:     · Level of [place]	independent	    REVIEW OF SYSTEMS  Constitutional - No fever,  +fatigue  Neurological - +loss of strength   Musculoskeletal - No joint pain, No joint swelling, No muscle pain    VITALS  T(C): 37.1 (05-13-19 @ 07:56), Max: 37.5 (05-12-19 @ 16:46)  HR: 79 (05-13-19 @ 07:56) (75 - 83)  BP: 124/73 (05-13-19 @ 07:56) (115/66 - 134/67)  RR: 18 (05-13-19 @ 07:56) (18 - 18)  SpO2: 97% (05-13-19 @ 07:56) (97% - 99%)  Wt(kg): --    MEDICATIONS   acetaminophen   Tablet .. 650 milliGRAM(s) every 6 hours  ALPRAZolam 0.5 milliGRAM(s) three times a day PRN  ascorbic acid 500 milliGRAM(s) daily  atorvastatin 20 milliGRAM(s) at bedtime  calcium carbonate 1250 mG  + Vitamin D (OsCal 500 + D) 1 Tablet(s) two times a day  cholecalciferol 2000 Unit(s) daily  enoxaparin Injectable 40 milliGRAM(s) daily  famotidine    Tablet 20 milliGRAM(s) two times a day  gabapentin 600 milliGRAM(s) three times a day  levothyroxine 75 MICROGram(s) daily  lidocaine   Patch 1 Patch daily PRN  methocarbamol 500 milliGRAM(s) every 6 hours  ondansetron Injectable 4 milliGRAM(s) every 4 hours PRN  oxyCODONE    IR 5 milliGRAM(s) every 4 hours PRN  oxyCODONE    IR 10 milliGRAM(s) every 4 hours PRN  raloxifene 60 milliGRAM(s) daily  sodium chloride 0.9% lock flush 3 milliLiter(s) every 8 hours  topiramate 50 milliGRAM(s) two times a day  venlafaxine XR. 225 milliGRAM(s) daily  verapamil  milliGRAM(s) daily  ZOLMitriptan 5 milliGRAM(s) daily PRN      RECENT LABS - Reviewed                        9.4    8.2   )-----------( 263      ( 13 May 2019 08:22 )             29.1     05-13    143  |  111<H>  |  7.0<L>  ----------------------------<  117<H>  3.4<L>   |  22.0  |  0.68    Ca    8.4<L>      13 May 2019 08:22  Phos  2.5     05-13  Mg     2.0     05-13                ----------------------------------------------------------------------------------------  PHYSICAL EXAM   Constitutional - NAD, Comfortable  Extremities - No swelling  Neurologic Exam -                    Cognitive - AAOx self, brief maintenance of her      Motor - Unable to assess given lethargy     Sensory - Withdraws locally     Psychiatric - Lethargic  ----------------------------------------------------------------------------------------    ASSESSMENT/PLAN  69y Female with functional deficits after L2-5 lami/fusion on 5/8  Pain - Tylenol, Neurontin, Lidoderm, Robaxin, Oxycodone - may need optimization of medications considering above exam  Arousal - Continue OOB as feasible/allowable  Anxiety - Xanax   Constipation - Senna  Hypothyroid - Synthroid  Migraine - Topamax, Zomig  DVT PPX - SCDs, Lovenox  Rehab - Recommend ACUTE inpatient rehabilitation for the functional deficits consisting of 3 hours of therapy/day & 24 hour RN/daily PMR physician for comorbid medical management. Will continue to follow for ongoing rehab needs and recommendations. Patient will be able to tolerate 3 hours a day.    Continue bedside therapy as well as OOB throughout the day with mobilization throughout the day with staff to maintain cardiopulmonary function and prevention of secondary complications related to debility.

## 2019-05-13 NOTE — PROGRESS NOTE ADULT - ATTENDING COMMENTS
NSGY Attg:    see above    patient seen    dressing dry    ambulating with improved pain and without headache    agree with plan as above
NSGY Attg:    see above    patient seen and examined    POD 3 s/p revision/extension of fusion and laminectomy    patient complains of mild headache; some exacerbation of headache earlier this am when HOB raised; RLE radicular pain improved    Exam:  alert  conversant  PERRL  EOMI  CORDERO to command  stable/improved RLE distal strength compared to pre-op  wound C/D/I    CT LS spine reviewed    A/P:  - agree with plan as above  - elevate HOB as tolerated  - pain control  - DVT ppx
NSGY Attg:    see above    patient seen and examined    denies headache; states RLE pain improved compared to pre-op    LLE 5/5  RLE distal strength improved compared to immediately pre-op    agree with plan as above  CT LS spine pending
NSGY Attg:    see above    patient seen; denies headache    improved RLE radicular pain    agree with plan as above
NSGY Attg:    see above    patient seen and examined    endorses mild headache relieved with Tylenol (patient with history of headaches)    post-op exam stable    agree with plan as above; patient will need brace

## 2019-05-13 NOTE — PHYSICAL THERAPY INITIAL EVALUATION ADULT - DIAGNOSIS, PT EVAL
Decreased Functional Mobility / s/p lumbar fusion L2-L3, L3-L4 laminectomy/fusion at L4-L5, intra-op

## 2019-05-13 NOTE — CHART NOTE - NSCHARTNOTEFT_GEN_A_CORE
Upon Nutritional Assessment by the Registered Dietitian your patient was determined to meet criteria / has evidence of the following diagnosis/diagnoses:          [ ]  Mild Protein Calorie Malnutrition        [x]  Moderate Protein Calorie Malnutrition        [ ] Severe Protein Calorie Malnutrition        [ ] Unspecified Protein Calorie Malnutrition        [ ] Underweight / BMI <19        [ ] Morbid Obesity / BMI > 40    Pt presents with malnutrition (moderate, chronic) related to inability to meet sufficient protein-energy in setting of prolonged decreased appetite as evidenced by meeting <75% nutrient needs >1 month, mild muscle loss of clavicles and shoulders mild fat loss of orbitals, 11.1% wt loss x ~9 months, 1+ BL foot edema.    Findings as based on:  •  Comprehensive nutrition assessment and consultation  •  Calorie counts (nutrient intake analysis)  •  Food acceptance and intake status from observations by staff  •  Follow up  •  Patient education  •  Intervention secondary to interdisciplinary rounds  •   concerns      Treatment:    The following has been recommended:    1) Continue regular diet.  2) Will provide Gelatein BID to optimize po intake and provide an additional 150 kcal, 20g protein per serving.  3) Rx: vitamin C 500mg daily.       PROVIDER Section:     By signing this assessment you are acknowledging and agree with the diagnosis/diagnoses assigned by the Registered Dietitian    Comments:

## 2019-05-13 NOTE — PROGRESS NOTE ADULT - REASON FOR ADMISSION
Spinal stenosis
low back and RLE radicular pain
s/p L2-4 lami/fusion, revision fusion L45
Lumbar stenosis & RLE>LLE radiculopathy/ posterior fusion revision/ extension
elective lumbar fusion

## 2019-05-13 NOTE — PROGRESS NOTE ADULT - SUBJECTIVE AND OBJECTIVE BOX
INTERVAL HPI/OVERNIGHT EVENTS:  69y Female PMH GERD, HLD, hypothyroid, IBS, migraine, history of lumbar fusion, now s/p L2-3, 3-4 laminectomy and fusion with extension/revision to L4-5 POD#5. Patient seen earlier this AM sitting comfortably in chair, doing well. Participated in OT today.    Vital Signs Last 24 Hrs  T(C): 37.1 (13 May 2019 07:56), Max: 37.5 (12 May 2019 16:46)  T(F): 98.7 (13 May 2019 07:56), Max: 99.5 (12 May 2019 16:46)  HR: 79 (13 May 2019 07:56) (75 - 83)  BP: 124/73 (13 May 2019 07:56) (115/66 - 134/67)  BP(mean): --  RR: 18 (13 May 2019 07:56) (18 - 18)  SpO2: 97% (13 May 2019 07:56) (97% - 99%)    PHYSICAL EXAM:  GENERAL: NAD, well-groomed, well-developed  HEAD:  Atraumatic, normocephalic  MENTAL STATUS: AOx2. Appropriately conversant without aphasia. Following commands  CRANIAL NERVES: PERRL. EOMI. Face symmetric w/ normal eye closure and smile, tongue midline. Hearing grossly intact. Speech clear  MOTOR: strength 5/5 b/l upper extremities  Lowers      HF      KE        PF      DF    EHL  R             5/5     4+/5    4+/5    4/5    4/5     L              5/5    5/5        5/5    5/5    5/5    SENSATION: grossly intact to light touch all extremities  CHEST/LUNG: Nonlabored breathing, no respiratory distress  SKIN: Warm, dry  EXTREMITIES: No calf tenderness b/l    LABS:                        9.4    8.2   )-----------( 263      ( 13 May 2019 08:22 )             29.1     05-13    143  |  111<H>  |  7.0<L>  ----------------------------<  117<H>  3.4<L>   |  22.0  |  0.68    Ca    8.4<L>      13 May 2019 08:22  Phos  2.5     05-13  Mg     2.0     05-13 05-12 @ 07:01  -  05-13 @ 07:00  --------------------------------------------------------  IN: 0 mL / OUT: 3150 mL / NET: -3150 mL    RADIOLOGY & ADDITIONAL TESTS:  CT Lumbar Spine No Cont (05.10.19 @ 18:00)  There is mild mid lumbar levoscoliosis. There is no compression deformity   or osseous destruction. Long-standing posterior fusion of L4-5 and L5-S1   has been extended cranially to the L2 level with posterior plate and   screw fixation hardware. The hardware is grossly intact. There is minimal   anterolisthesis at L3-L4.There is ankylosis related to ventral   syndesmophytes at T11-12 with partial ankylosis at T12-L1 and L1-L2 with   relative preservation of the disc spaces. There are laminectomy defects,   new extending from L1 through L3 superimposed on old or defects caudally.   There is severe multilevel facet arthropathy notable for mild to moderate   residual spinal stenosis at L3-L4. Recent postoperative gas extends   dorsally to the skin surface without identified epidural collection.  IMPRESSION:  Immediate postoperative findings as above

## 2019-05-13 NOTE — PROGRESS NOTE ADULT - ASSESSMENT
69y Female PMH GERD, HLD, hypothyroid, IBS, migraine, history of lumbar fusion, now s/p L2-3, 3-4 laminectomy and fusion with extension/revision to L4-5 POD#4  - New dressing placed over incision- no active drainage from incision site appreciated  - Denies headache. Feeling better today. Pain improved    PLAN:  - Will d/w attending  - Pain control PRN, pain management following  - Diet as tolerated  - PT eval today  - OT following  - PM&R following, recs pending PT eval  - Lovenox & SCDs for DVT ppx  - Dispo pending PT/OT/PM&R recs

## 2019-05-13 NOTE — DIETITIAN INITIAL EVALUATION ADULT. - PHYSICAL APPEARANCE
obese/BMI 33.4 NFPE performed, physical findings include mild muscle loss of clavicles and shoulders and mild fat loss of orbitals

## 2019-05-13 NOTE — DIETITIAN INITIAL EVALUATION ADULT. - PERTINENT LABORATORY DATA
05-13 Na143 mmol/L Glu 117 mg/dL<H> K+ 3.4 mmol/L<L> Cr  0.68 mg/dL BUN 7.0 mg/dL<L> Phos 2.5 mg/dL Alb n/a   PAB n/a

## 2019-05-13 NOTE — DIETITIAN INITIAL EVALUATION ADULT. - NS AS NUTRI INTERV MEALS SNACK
Continue regular diet as tolerated- encouraged HBV protein sources and to make protein a priority at meals

## 2019-05-14 ENCOUNTER — TRANSCRIPTION ENCOUNTER (OUTPATIENT)
Age: 69
End: 2019-05-14

## 2019-05-14 VITALS
OXYGEN SATURATION: 99 % | RESPIRATION RATE: 18 BRPM | SYSTOLIC BLOOD PRESSURE: 120 MMHG | TEMPERATURE: 98 F | DIASTOLIC BLOOD PRESSURE: 64 MMHG | HEART RATE: 72 BPM

## 2019-05-14 PROCEDURE — 80048 BASIC METABOLIC PNL TOTAL CA: CPT

## 2019-05-14 PROCEDURE — 97163 PT EVAL HIGH COMPLEX 45 MIN: CPT

## 2019-05-14 PROCEDURE — 85027 COMPLETE CBC AUTOMATED: CPT

## 2019-05-14 PROCEDURE — 87206 SMEAR FLUORESCENT/ACID STAI: CPT

## 2019-05-14 PROCEDURE — 87075 CULTR BACTERIA EXCEPT BLOOD: CPT

## 2019-05-14 PROCEDURE — 36415 COLL VENOUS BLD VENIPUNCTURE: CPT

## 2019-05-14 PROCEDURE — 72131 CT LUMBAR SPINE W/O DYE: CPT

## 2019-05-14 PROCEDURE — 87102 FUNGUS ISOLATION CULTURE: CPT

## 2019-05-14 PROCEDURE — 97116 GAIT TRAINING THERAPY: CPT

## 2019-05-14 PROCEDURE — 97167 OT EVAL HIGH COMPLEX 60 MIN: CPT

## 2019-05-14 PROCEDURE — 83735 ASSAY OF MAGNESIUM: CPT

## 2019-05-14 PROCEDURE — 87070 CULTURE OTHR SPECIMN AEROBIC: CPT

## 2019-05-14 PROCEDURE — 97110 THERAPEUTIC EXERCISES: CPT

## 2019-05-14 PROCEDURE — C1889: CPT

## 2019-05-14 PROCEDURE — C1763: CPT

## 2019-05-14 PROCEDURE — 99232 SBSQ HOSP IP/OBS MODERATE 35: CPT

## 2019-05-14 PROCEDURE — 76000 FLUOROSCOPY <1 HR PHYS/QHP: CPT

## 2019-05-14 PROCEDURE — 87015 SPECIMEN INFECT AGNT CONCNTJ: CPT

## 2019-05-14 PROCEDURE — C1713: CPT

## 2019-05-14 PROCEDURE — 84100 ASSAY OF PHOSPHORUS: CPT

## 2019-05-14 PROCEDURE — 87116 MYCOBACTERIA CULTURE: CPT

## 2019-05-14 RX ORDER — METHOCARBAMOL 500 MG/1
1 TABLET, FILM COATED ORAL
Qty: 60 | Refills: 0
Start: 2019-05-14 | End: 2019-05-27

## 2019-05-14 RX ORDER — OXYCODONE HYDROCHLORIDE 5 MG/1
1 TABLET ORAL
Qty: 0 | Refills: 0 | DISCHARGE
Start: 2019-05-14

## 2019-05-14 RX ORDER — OXYCODONE HYDROCHLORIDE 5 MG/1
1 TABLET ORAL
Qty: 42 | Refills: 0
Start: 2019-05-14 | End: 2019-05-20

## 2019-05-14 RX ORDER — HYDROCODONE BITARTRATE 50 MG/1
1 CAPSULE, EXTENDED RELEASE ORAL
Qty: 0 | Refills: 0 | DISCHARGE

## 2019-05-14 RX ORDER — ASCORBIC ACID 60 MG
1 TABLET,CHEWABLE ORAL
Qty: 0 | Refills: 0 | DISCHARGE
Start: 2019-05-14

## 2019-05-14 RX ADMIN — Medication 75 MICROGRAM(S): at 05:54

## 2019-05-14 RX ADMIN — Medication 650 MILLIGRAM(S): at 12:24

## 2019-05-14 RX ADMIN — Medication 225 MILLIGRAM(S): at 12:23

## 2019-05-14 RX ADMIN — ENOXAPARIN SODIUM 40 MILLIGRAM(S): 100 INJECTION SUBCUTANEOUS at 12:23

## 2019-05-14 RX ADMIN — Medication 1 TABLET(S): at 05:54

## 2019-05-14 RX ADMIN — OXYCODONE HYDROCHLORIDE 10 MILLIGRAM(S): 5 TABLET ORAL at 10:16

## 2019-05-14 RX ADMIN — METHOCARBAMOL 500 MILLIGRAM(S): 500 TABLET, FILM COATED ORAL at 12:23

## 2019-05-14 RX ADMIN — Medication 240 MILLIGRAM(S): at 05:55

## 2019-05-14 RX ADMIN — RALOXIFENE HYDROCHLORIDE 60 MILLIGRAM(S): 60 TABLET, COATED ORAL at 12:23

## 2019-05-14 RX ADMIN — Medication 650 MILLIGRAM(S): at 06:54

## 2019-05-14 RX ADMIN — Medication 650 MILLIGRAM(S): at 12:55

## 2019-05-14 RX ADMIN — FAMOTIDINE 20 MILLIGRAM(S): 10 INJECTION INTRAVENOUS at 05:54

## 2019-05-14 RX ADMIN — Medication 500 MILLIGRAM(S): at 12:23

## 2019-05-14 RX ADMIN — SODIUM CHLORIDE 3 MILLILITER(S): 9 INJECTION INTRAMUSCULAR; INTRAVENOUS; SUBCUTANEOUS at 05:22

## 2019-05-14 RX ADMIN — SODIUM CHLORIDE 3 MILLILITER(S): 9 INJECTION INTRAMUSCULAR; INTRAVENOUS; SUBCUTANEOUS at 14:42

## 2019-05-14 RX ADMIN — METHOCARBAMOL 500 MILLIGRAM(S): 500 TABLET, FILM COATED ORAL at 05:54

## 2019-05-14 RX ADMIN — Medication 650 MILLIGRAM(S): at 05:54

## 2019-05-14 RX ADMIN — OXYCODONE HYDROCHLORIDE 10 MILLIGRAM(S): 5 TABLET ORAL at 10:50

## 2019-05-14 RX ADMIN — OXYCODONE HYDROCHLORIDE 10 MILLIGRAM(S): 5 TABLET ORAL at 04:07

## 2019-05-14 RX ADMIN — OXYCODONE HYDROCHLORIDE 10 MILLIGRAM(S): 5 TABLET ORAL at 05:05

## 2019-05-14 RX ADMIN — GABAPENTIN 600 MILLIGRAM(S): 400 CAPSULE ORAL at 05:54

## 2019-05-14 RX ADMIN — GABAPENTIN 600 MILLIGRAM(S): 400 CAPSULE ORAL at 14:59

## 2019-05-14 RX ADMIN — OXYCODONE HYDROCHLORIDE 10 MILLIGRAM(S): 5 TABLET ORAL at 15:04

## 2019-05-14 RX ADMIN — Medication 650 MILLIGRAM(S): at 00:27

## 2019-05-14 RX ADMIN — Medication 50 MILLIGRAM(S): at 05:54

## 2019-05-14 RX ADMIN — Medication 2000 UNIT(S): at 12:24

## 2019-05-14 RX ADMIN — OXYCODONE HYDROCHLORIDE 10 MILLIGRAM(S): 5 TABLET ORAL at 15:35

## 2019-05-14 NOTE — PROGRESS NOTE ADULT - SUBJECTIVE AND OBJECTIVE BOX
Patient is sitting in the chair. Alert and conversing.   Reports no headaches this AM. Her back pain is also controlled.   TLSO is helping.  Reviewed therapy notations after patient seen and making significant progress to warrant alternate dispo for DC.  Noted no dropped foot on the right, do not recommend ongoing use of AFO.     FUNCTIONAL PROGRESS  5/13  Bed Mobility: Supine to Sit:     · Level of [place]	minimum assist (75% patients effort)	  · Physical Assist/Nonphysical Assist	1 person assist	    Transfer: Sit to Stand:     · Level of [place]	contact guard	  · Physical Assist/Nonphysical Assist	1 person assist	  · Weight-Bearing Restrictions	full weight-bearing	  · Assistive Device	rolling walker	    Transfer: Stand to Sit:     · Level of [place]	contact guard	  · Physical Assist/Nonphysical Assist	1 person assist	  · Weight-Bearing Restrictions	full weight-bearing	  · Assistive Device	rolling walker	    Gait Skills:     · Level of [place]	supervision	  · Physical Assist/Nonphysical Assist	supervision	  · Weight-Bearing Restrictions	full weight-bearing	  · Assistive Device	rolling walker	  · Gait Distance	200 feet	  · Brace/Orthotics	LSO	    Gait Analysis:     · Gait Pattern Used	swing-through gait	  · Gait Deviations Noted	decreased daysi; decreased stride length; decreased step length	  · Impairments Contributing to Gait Deviations	decreased ROM; pain; decreased strength	    Stair Negotiation:     · Level of [place]	N/A: pt just woke up not feeling very strong	        REVIEW OF SYSTEMS  Constitutional - No fever,  No fatigue  Neurological - No headaches, No memory loss, +loss of strength  Musculoskeletal - +muscle pain  Psychiatric - No depression, No anxiety    VITALS  T(C): 37 (05-14-19 @ 07:49), Max: 37.1 (05-13-19 @ 16:23)  HR: 70 (05-14-19 @ 07:49) (70 - 124)  BP: 128/65 (05-14-19 @ 07:49) (114/55 - 128/65)  RR: 16 (05-14-19 @ 07:49) (16 - 19)  SpO2: 98% (05-14-19 @ 07:49) (92% - 98%)  Wt(kg): --    MEDICATIONS   acetaminophen   Tablet .. 650 milliGRAM(s) every 6 hours  ALPRAZolam 0.5 milliGRAM(s) three times a day PRN  ascorbic acid 500 milliGRAM(s) daily  atorvastatin 20 milliGRAM(s) at bedtime  calcium carbonate 1250 mG  + Vitamin D (OsCal 500 + D) 1 Tablet(s) two times a day  cholecalciferol 2000 Unit(s) daily  enoxaparin Injectable 40 milliGRAM(s) daily  famotidine    Tablet 20 milliGRAM(s) two times a day  gabapentin 600 milliGRAM(s) three times a day  levothyroxine 75 MICROGram(s) daily  lidocaine   Patch 1 Patch daily PRN  methocarbamol 500 milliGRAM(s) every 6 hours  ondansetron Injectable 4 milliGRAM(s) every 4 hours PRN  oxyCODONE    IR 5 milliGRAM(s) every 4 hours PRN  oxyCODONE    IR 10 milliGRAM(s) every 4 hours PRN  raloxifene 60 milliGRAM(s) daily  sodium chloride 0.9% lock flush 3 milliLiter(s) every 8 hours  topiramate 50 milliGRAM(s) two times a day  venlafaxine XR. 225 milliGRAM(s) daily  verapamil  milliGRAM(s) daily  ZOLMitriptan 5 milliGRAM(s) daily PRN      RECENT LABS - Reviewed                        9.4    8.2   )-----------( 263      ( 13 May 2019 08:22 )             29.1     05-13    143  |  111<H>  |  7.0<L>  ----------------------------<  117<H>  3.4<L>   |  22.0  |  0.68    Ca    8.4<L>      13 May 2019 08:22  Phos  2.5     05-13  Mg     2.0     05-13                  ----------------------------------------------------------------------------------------  PHYSICAL EXAM   Constitutional - NAD, Comfortable  Extremities - No swelling  Neurologic Exam -                    Cognitive - AAOx3     Motor - DF 5/5     Sensory - Intact     Psychiatric - Stable  ----------------------------------------------------------------------------------------    ASSESSMENT/PLAN  69y Female with functional deficits after L2-5 lami/fusion on 5/8  Pain - Tylenol, Neurontin, Lidoderm, Robaxin, Oxycodone - may need optimization of medications considering above exam  Arousal - Continue OOB as feasible/allowable  Anxiety - Xanax   Constipation - Senna  Hypothyroid - Synthroid  Migraine - Topamax, Zomig  DVT PPX - SCDs, Lovenox  Rehab - Patient is supervision/CG with her mobility. Patient is making progress for DC HOME. HOME vs. OUTPATIENT.    Continue bedside therapy as well as OOB throughout the day with mobilization throughout the day with staff to maintain cardiopulmonary function and prevention of secondary complications related to debility. 	    Will sign off, please reconsult for additional rehab dispo needs if functional status changes.

## 2019-05-14 NOTE — DISCHARGE NOTE PROVIDER - NSDCCPCAREPLAN_GEN_ALL_CORE_FT
PRINCIPAL DISCHARGE DIAGNOSIS  Diagnosis: Lumbar disc disease with radiculopathy  Assessment and Plan of Treatment:

## 2019-05-14 NOTE — DISCHARGE NOTE PROVIDER - CARE PROVIDER_API CALL
Cruz Galeas)  Neurosurgery  [facility], [address] [place] [address]  Phone: [phone]  Fax: [phone]  Follow Up Time:

## 2019-05-14 NOTE — DISCHARGE NOTE NURSING/CASE MANAGEMENT/SOCIAL WORK - NSDCDPATPORTLINK_GEN_ALL_CORE
You can access the [facility] Portal, offered by [facility], by registering with the following website: [email or web address]

## 2019-05-14 NOTE — DISCHARGE NOTE PROVIDER - NSDCFUADDINST_GEN_ALL_CORE_FT
keep incision clean and dry. May put silvadene on the linear abrasion that are due to the tape every 12 hours for the next 3 days then discontinue and air dry only.  Avoid lying on the back for prolong periods.  Have a family member look at the incision once per day to assure clean and dry.  If drainage or reddness of the staple line noted pls call office.  If noted to have temperature call office. Avoid bathing may wet incision on Saturday in shower and pat dry.

## 2019-05-14 NOTE — DISCHARGE NOTE PROVIDER - HOSPITAL COURSE
69yf hx of lower back pain with radiculopathy to the right lower extrem now with new left lower leg symptoms.   Noted to have severe stenosis and spondylosis.

## 2019-05-16 RX ORDER — METHOCARBAMOL 500 MG/1
500 TABLET, FILM COATED ORAL 3 TIMES DAILY
Qty: 30 | Refills: 2 | Status: ACTIVE | COMMUNITY
Start: 2019-05-16 | End: 1900-01-01

## 2019-05-16 RX ORDER — OXYCODONE AND ACETAMINOPHEN 10; 325 MG/1; MG/1
10-325 TABLET ORAL
Qty: 40 | Refills: 0 | Status: DISCONTINUED | OUTPATIENT
Start: 2019-05-16 | End: 2019-05-16

## 2019-05-16 RX ORDER — OXYCODONE AND ACETAMINOPHEN 10; 325 MG/1; MG/1
10-325 TABLET ORAL
Qty: 42 | Refills: 0 | Status: ACTIVE | COMMUNITY
Start: 2019-05-16 | End: 1900-01-01

## 2019-05-23 ENCOUNTER — APPOINTMENT (OUTPATIENT)
Dept: NEUROSURGERY | Facility: CLINIC | Age: 69
End: 2019-05-23
Payer: MEDICARE

## 2019-05-23 VITALS
HEART RATE: 73 BPM | HEIGHT: 66 IN | SYSTOLIC BLOOD PRESSURE: 133 MMHG | TEMPERATURE: 98.8 F | BODY MASS INDEX: 32.14 KG/M2 | WEIGHT: 200 LBS | DIASTOLIC BLOOD PRESSURE: 77 MMHG

## 2019-05-23 PROCEDURE — 99024 POSTOP FOLLOW-UP VISIT: CPT

## 2019-05-28 NOTE — REASON FOR VISIT
[Spouse] : spouse [de-identified] : s/p L2-3, 3-4 laminectomy and fusion [de-identified] : 05/08/2019 [de-identified] : Ms. Alegre presents for post op visit and removal of staples.  She presents and has been compliant with LSO bracing when OOB.  She reports mild to moderate lumbar region pain. 3/10.  She reports 70% of right lower extremity radicular pain.  She is ambulating with assistance of a cane.  She is participating with physical therapy and tolerating well.  Her incision is dry and intact.  Wound edges well approximated. The excoriated area related to paper tape is much improved.  No evidence of infection.  Denies any fever or chills.

## 2019-05-28 NOTE — ASSESSMENT
[FreeTextEntry1] : Ms. Alegre is doing well from a post operative perspective.  She reports about 70 % improvement of her radicular symptoms.   She will continue LSO bracing.  She will continue physical therapy for core/quadriceps and pain modalities.  Patient will maintain lumbar incision clean and dry.  She will continue gabapentin,  Baclofen and oxycodone to manage post operative pain.  She should follow up at the 1 month [place] with xray lumbar spine to assess the hardware alignment.  She knows to follow up after imaging.  She should call the office if there are any new or worsening symptoms.

## 2019-05-28 NOTE — PHYSICAL EXAM
[General Appearance - Alert] : alert [General Appearance - In No Acute Distress] : in no acute distress [General Appearance - Well Nourished] : well nourished [General Appearance - Well Developed] : well developed [General Appearance - Well-Appearing] : healthy appearing [] : normal voice and communication [Oriented To Time, Place, And Person] : oriented to person, place, and time [Impaired Insight] : insight and judgment were intact [Affect] : the affect was normal [Mood] : the mood was normal [Memory Recent] : recent memory was not impaired [Memory Remote] : remote memory was not impaired [Person] : oriented to person [Place] : oriented to place [Time] : oriented to time [Concentration Intact] : normal concentrating ability [Fluency] : fluency intact [Comprehension] : comprehension intact [Cranial Nerves Oculomotor (III)] : extraocular motion intact [Cranial Nerves Trigeminal (V)] : facial sensation intact symmetrically [Cranial Nerves Glossopharyngeal (IX)] : tongue and palate midline [Cranial Nerves Accessory (XI - Cranial And Spinal)] : head turning and shoulder shrug symmetric [Cranial Nerves Hypoglossal (XII)] : there was no tongue deviation with protrusion [Motor Tone] : muscle tone was normal in all four extremities [Motor Strength] : muscle strength was normal in all four extremities [Balance] : balance was intact [No Visual Abnormalities] : no visible abnormailities [Antalgic] : antalgic [Longitudinal] : longitudinal [Clean] : clean [Intact] : intact [No Drainage] : without drainage [Normal Skin] : normal [Erythema] : not erythematous [Tender] : not tender [Fluctuant] : not fluctuant [Normal Skin Turgor] : skin turgor was normal [FreeTextEntry1] : lumbar region,  Staples removed, tolerated well.  [Over the Past 2 Weeks, Have You Felt Down, Depressed, or Hopeless?] : 1.) Over the past 2 weeks, have you felt down, depressed, or hopeless? No [Over the Past 2 Weeks, Have You Felt Little Interest or Pleasure Doing Things?] : 2.) Over the past 2 weeks, have you felt little interest or pleasure doing things? No [Able to toe walk] : the patient was not able to toe walk [Able to heel walk] : the patient was not able to heel walk

## 2019-06-03 LAB
CULTURE RESULTS: SIGNIFICANT CHANGE UP
SPECIMEN SOURCE: SIGNIFICANT CHANGE UP

## 2019-06-25 ENCOUNTER — APPOINTMENT (OUTPATIENT)
Dept: NEUROSURGERY | Facility: CLINIC | Age: 69
End: 2019-06-25
Payer: MEDICARE

## 2019-06-25 VITALS
TEMPERATURE: 97.5 F | BODY MASS INDEX: 28.95 KG/M2 | HEIGHT: 68 IN | WEIGHT: 191 LBS | DIASTOLIC BLOOD PRESSURE: 79 MMHG | HEART RATE: 71 BPM | SYSTOLIC BLOOD PRESSURE: 135 MMHG

## 2019-06-25 PROCEDURE — 99024 POSTOP FOLLOW-UP VISIT: CPT

## 2019-06-27 NOTE — PHYSICAL EXAM
[General Appearance - Alert] : alert [General Appearance - In No Acute Distress] : in no acute distress [General Appearance - Well Developed] : well developed [General Appearance - Well-Appearing] : healthy appearing [General Appearance - Well Nourished] : well nourished [] : normal voice and communication [Longitudinal] : longitudinal [Dry] : dry [] :  [Clean] : clean [No Drainage] : without drainage [Normal Skin] : normal [Upper Portion] : upper portion [Erythema] : erythematous [Normal Skin Turgor] : skin turgor was normal [Oriented To Time, Place, And Person] : oriented to person, place, and time [Impaired Insight] : insight and judgment were intact [Memory Recent] : recent memory was not impaired [Mood] : the mood was normal [Affect] : the affect was normal [Memory Remote] : remote memory was not impaired [Person] : oriented to person [Concentration Intact] : normal concentrating ability [Time] : oriented to time [Place] : oriented to place [Cranial Nerves Oculomotor (III)] : extraocular motion intact [Fluency] : fluency intact [Comprehension] : comprehension intact [Cranial Nerves Accessory (XI - Cranial And Spinal)] : head turning and shoulder shrug symmetric [Cranial Nerves Glossopharyngeal (IX)] : tongue and palate midline [Cranial Nerves Trigeminal (V)] : facial sensation intact symmetrically [Sensation Tactile Decrease] : light touch was intact [Motor Strength] : muscle strength was normal in all four extremities [Cranial Nerves Hypoglossal (XII)] : there was no tongue deviation with protrusion [Motor Tone] : muscle tone was normal in all four extremities [Balance] : balance was intact [Sensation Pain / Temperature Decrease] : pain and temperature was intact [Tender] : not tender [Indurated] : not indurated [FreeTextEntry1] : 0.5 cm superior aspect. [Over the Past 2 Weeks, Have You Felt Down, Depressed, or Hopeless?] : 1.) Over the past 2 weeks, have you felt down, depressed, or hopeless? No [Over the Past 2 Weeks, Have You Felt Little Interest or Pleasure Doing Things?] : 2.) Over the past 2 weeks, have you felt little interest or pleasure doing things? No [FreeTextEntry8] : ambulates with a cane.

## 2019-06-27 NOTE — REASON FOR VISIT
[Spouse] : spouse [de-identified] : s/p L2-3, 3-4 laminectomy and fusion  [de-identified] : 05/08/2019 [de-identified] : Ms. Alegre presents for post op visit at 6 week [facility].  She presents and has been compliant with LSO bracing when OOB. She reports mild to moderate lumbar region pain. 3/10. She reports 70-80% of right lower extremity radicular pain. She is ambulating with assistance of a cane. She has some residual unsteadiness of gait.  She is participating with physical therapy and tolerating well. The superior aspect of incision has a 0.5 cm not well approximated skin.  No drainage.  She states PT brought it to her attention last week. She states there was yellow/green drainage last week. She has been applying Bacitracin. No evidence of infection. Denies any fever or chills.  Denies any headaches or neck pain. \par

## 2019-06-27 NOTE — REVIEW OF SYSTEMS
[As Noted in HPI] : as noted in HPI [Difficulty Walking] : difficulty walking [Joint Pain] : joint pain [Negative] : Endocrine [Incontinence] : no incontinence [FreeTextEntry9] : LBP

## 2019-06-27 NOTE — ASSESSMENT
[FreeTextEntry1] : Ms. Chacon is doing well from the post operative perspective.  She reports 70 -80% of right LE radicular pain.  She continues to have difficulty with LE  coordination but is involved with physical therapy.  She has a 0.5 cm area of not well approximated skin at superior border of incision which likely attributed to moisture with bracing and inner cloth sleeve.  She is instructing to cleanse the area with Betadine daily. She will allow brief periods throughout day to allow the incision to be exposed to air.  She was recommended to follow up in 1 week for wound check. Patient states she wishes to return in two weeks at time of 's scheduled follow up appointment. She is instructed to contact our office if there are any new or worsening symptoms.  She will continue physical therapy as well for LE strengthening and conditioning.

## 2019-06-29 LAB
CULTURE RESULTS: SIGNIFICANT CHANGE UP
SPECIMEN SOURCE: SIGNIFICANT CHANGE UP

## 2019-07-11 ENCOUNTER — APPOINTMENT (OUTPATIENT)
Dept: NEUROSURGERY | Facility: CLINIC | Age: 69
End: 2019-07-11
Payer: MEDICARE

## 2019-07-11 VITALS
DIASTOLIC BLOOD PRESSURE: 80 MMHG | HEART RATE: 76 BPM | TEMPERATURE: 98.7 F | BODY MASS INDEX: 28.95 KG/M2 | WEIGHT: 191 LBS | SYSTOLIC BLOOD PRESSURE: 143 MMHG | HEIGHT: 68 IN

## 2019-07-11 PROCEDURE — 99024 POSTOP FOLLOW-UP VISIT: CPT

## 2019-07-12 NOTE — REASON FOR VISIT
[de-identified] : s/p L2-3, 3-4 laminectomy and fusion  [de-identified] : 05/08/2019 [de-identified] :  Ms. Alegre presents for post op visit. She presents and has been compliant with LSO bracing when OOB. She reports mild to moderate lumbar region pain. 3/10. She reports 70-80% of right lower extremity radicular pain. She is ambulating with assistance of a cane. She has some residual unsteadiness of gait. She is participating with physical therapy and tolerating well. The 0.5 cm area  incision has healed since her last visit.  No drainage.  She has been applying Bacitracin. No evidence of infection. Denies any fever or chills. Denies any headaches or neck pain. Patient states she had a recent PET scan and has a questionable iliac crest lesion.  She will follow up with oncology for further evaluation.  \par

## 2019-07-12 NOTE — ASSESSMENT
[FreeTextEntry1] : Ms. Alegre presents for follow-up with interval history as above.  Overall, she is doing quite well from a post-operative perspective.  I will see her back in 1 month at the 3 month post-op [facility].  Her incision is well-headed.  She knows to call the office with any new or concerning symptoms in the interim.

## 2019-07-12 NOTE — PHYSICAL EXAM
[General Appearance - In No Acute Distress] : in no acute distress [General Appearance - Alert] : alert [General Appearance - Well Developed] : well developed [General Appearance - Well Nourished] : well nourished [General Appearance - Well-Appearing] : healthy appearing [] : normal voice and communication [Clean] : clean [Longitudinal] : longitudinal [Intact] : intact [Healing Well] : healing well [Dry] : dry [No Drainage] : without drainage [Normal Skin] : normal [Oriented To Time, Place, And Person] : oriented to person, place, and time [Mood] : the mood was normal [Affect] : the affect was normal [Impaired Insight] : insight and judgment were intact [Person] : oriented to person [Place] : oriented to place [Concentration Intact] : normal concentrating ability [Time] : oriented to time [Comprehension] : comprehension intact [Fluency] : fluency intact [Cranial Nerves Oculomotor (III)] : extraocular motion intact [Cranial Nerves Glossopharyngeal (IX)] : tongue and palate midline [Cranial Nerves Trigeminal (V)] : facial sensation intact symmetrically [Cranial Nerves Accessory (XI - Cranial And Spinal)] : head turning and shoulder shrug symmetric [Motor Strength] : muscle strength was normal in all four extremities [Motor Tone] : muscle tone was normal in all four extremities [Cranial Nerves Hypoglossal (XII)] : there was no tongue deviation with protrusion [Abnormal Walk] : normal gait [Balance] : balance was intact [Sensation Tactile Decrease] : light touch was intact [Sensation Pain / Temperature Decrease] : pain and temperature was intact [No Visual Abnormalities] : no visible abnormailities [Normal] : normal [Erythema] : not erythematous [Tender] : not tender [FreeTextEntry1] : lumbar region  [Over the Past 2 Weeks, Have You Felt Down, Depressed, or Hopeless?] : 1.) Over the past 2 weeks, have you felt down, depressed, or hopeless? No [Over the Past 2 Weeks, Have You Felt Little Interest or Pleasure Doing Things?] : 2.) Over the past 2 weeks, have you felt little interest or pleasure doing things? No

## 2019-07-12 NOTE — REVIEW OF SYSTEMS
[Feeling Tired] : feeling tired [As Noted in HPI] : as noted in HPI [Difficulty Walking] : difficulty walking [Negative] : Heme/Lymph [FreeTextEntry9] : LBP

## 2019-08-08 ENCOUNTER — APPOINTMENT (OUTPATIENT)
Dept: NEUROSURGERY | Facility: CLINIC | Age: 69
End: 2019-08-08
Payer: MEDICARE

## 2019-08-08 VITALS
HEART RATE: 78 BPM | HEIGHT: 68 IN | TEMPERATURE: 98.4 F | DIASTOLIC BLOOD PRESSURE: 81 MMHG | WEIGHT: 191 LBS | SYSTOLIC BLOOD PRESSURE: 138 MMHG | BODY MASS INDEX: 28.95 KG/M2

## 2019-08-08 PROCEDURE — 99213 OFFICE O/P EST LOW 20 MIN: CPT

## 2019-08-08 RX ORDER — ALPRAZOLAM 0.5 MG/1
0.5 TABLET ORAL
Qty: 60 | Refills: 0 | Status: ACTIVE | COMMUNITY
Start: 2019-05-07

## 2019-08-08 RX ORDER — RALOXIFENE HYDROCHLORIDE 60 MG/1
60 TABLET, FILM COATED ORAL
Qty: 90 | Refills: 0 | Status: ACTIVE | COMMUNITY
Start: 2019-04-01

## 2019-08-08 RX ORDER — DOXYCYCLINE HYCLATE 20 MG/1
20 TABLET ORAL
Qty: 60 | Refills: 0 | Status: ACTIVE | COMMUNITY
Start: 2019-02-27

## 2019-08-08 RX ORDER — DIAZEPAM 5 MG/1
5 TABLET ORAL
Qty: 60 | Refills: 0 | Status: ACTIVE | COMMUNITY
Start: 2019-05-07

## 2019-08-08 RX ORDER — VERAPAMIL HYDROCHLORIDE 240 MG/1
240 TABLET ORAL
Qty: 90 | Refills: 0 | Status: ACTIVE | COMMUNITY
Start: 2019-04-08

## 2019-08-08 RX ORDER — FLUOCINONIDE 0.5 MG/G
0.05 GEL TOPICAL
Qty: 60 | Refills: 0 | Status: ACTIVE | COMMUNITY
Start: 2019-02-15

## 2019-08-08 RX ORDER — GABAPENTIN 600 MG/1
600 TABLET, COATED ORAL
Qty: 270 | Refills: 0 | Status: ACTIVE | COMMUNITY
Start: 2019-05-07

## 2019-08-08 RX ORDER — FLUCONAZOLE 100 MG/1
100 TABLET ORAL
Qty: 10 | Refills: 0 | Status: ACTIVE | COMMUNITY
Start: 2019-02-14

## 2019-08-08 RX ORDER — OXYCODONE 5 MG/1
5 TABLET ORAL
Qty: 42 | Refills: 0 | Status: ACTIVE | COMMUNITY
Start: 2019-05-14

## 2019-08-08 RX ORDER — GABAPENTIN 300 MG/1
300 CAPSULE ORAL
Qty: 120 | Refills: 0 | Status: ACTIVE | COMMUNITY
Start: 2019-03-26

## 2019-08-08 RX ORDER — ROSUVASTATIN CALCIUM 5 MG/1
5 TABLET, FILM COATED ORAL
Qty: 90 | Refills: 0 | Status: ACTIVE | COMMUNITY
Start: 2019-04-01

## 2019-08-08 RX ORDER — VENLAFAXINE HYDROCHLORIDE 225 MG/1
225 TABLET, EXTENDED RELEASE ORAL
Qty: 90 | Refills: 0 | Status: ACTIVE | COMMUNITY
Start: 2019-05-07

## 2019-08-09 NOTE — REVIEW OF SYSTEMS
[As Noted in HPI] : as noted in HPI [Negative] : Endocrine [Numbness] : no numbness [Tingling] : no tingling [Tension Headache] : no tension-type headache [Difficulty Walking] : no difficulty walking [Incontinence] : no incontinence [FreeTextEntry9] : LBP

## 2019-08-09 NOTE — REASON FOR VISIT
[de-identified] : s/p L2-3, 3-4 laminectomy and fusion and she is here for a post-op visit. [de-identified] : 05/08/2019

## 2019-08-09 NOTE — HISTORY OF PRESENT ILLNESS
[FreeTextEntry1] : Ms. Alegre presents for follow-up.  She is doing relatively well from a post-operative perspective.  She has noted some recurrence of back and LE pain over the last week.  It was exacerbated by getting up out of a chair.  Overall, her pain is improved compared to her pre-operative baseline.

## 2019-08-09 NOTE — ASSESSMENT
[FreeTextEntry1] : Ms. Alegre presents for follow-up with interval history as above.  She may use the brace on a prn basis.  I will see her back in 1 month to assess her progress with continued conservative measures.  She and her  know to call the office with any new or concerning symptoms in the interim.

## 2019-08-09 NOTE — PHYSICAL EXAM
[General Appearance - In No Acute Distress] : in no acute distress [General Appearance - Alert] : alert [General Appearance - Well Developed] : well developed [General Appearance - Well Nourished] : well nourished [General Appearance - Well-Appearing] : healthy appearing [Longitudinal] : longitudinal [] : normal voice and communication [Clean] : clean [Dry] : dry [Healing Well] : healing well [No Drainage] : without drainage [Intact] : intact [Normal Skin] : normal [Oriented To Time, Place, And Person] : oriented to person, place, and time [Impaired Insight] : insight and judgment were intact [Affect] : the affect was normal [Mood] : the mood was normal [Person] : oriented to person [Place] : oriented to place [Time] : oriented to time [Concentration Intact] : normal concentrating ability [Comprehension] : comprehension intact [Fluency] : fluency intact [Cranial Nerves Oculomotor (III)] : extraocular motion intact [Cranial Nerves Glossopharyngeal (IX)] : tongue and palate midline [Cranial Nerves Trigeminal (V)] : facial sensation intact symmetrically [Cranial Nerves Accessory (XI - Cranial And Spinal)] : head turning and shoulder shrug symmetric [Cranial Nerves Hypoglossal (XII)] : there was no tongue deviation with protrusion [Motor Tone] : muscle tone was normal in all four extremities [Motor Strength] : muscle strength was normal in all four extremities [Sensation Pain / Temperature Decrease] : pain and temperature was intact [Sensation Tactile Decrease] : light touch was intact [Balance] : balance was intact [Abnormal Walk] : normal gait [No Visual Abnormalities] : no visible abnormailities [Normal] : normal [Erythema] : not erythematous [Tender] : not tender [FreeTextEntry1] : Lumbar region  [Over the Past 2 Weeks, Have You Felt Down, Depressed, or Hopeless?] : 1.) Over the past 2 weeks, have you felt down, depressed, or hopeless? No [Over the Past 2 Weeks, Have You Felt Little Interest or Pleasure Doing Things?] : 2.) Over the past 2 weeks, have you felt little interest or pleasure doing things? No [Cranial Nerves Optic (II)] : visual acuity intact bilaterally,  pupils equal round and reactive to light [Cranial Nerves Facial (VII)] : face symmetrical [FreeTextEntry6] : LE 5/5 bilaterally

## 2019-09-26 ENCOUNTER — APPOINTMENT (OUTPATIENT)
Dept: NEUROSURGERY | Facility: CLINIC | Age: 69
End: 2019-09-26
Payer: MEDICARE

## 2019-09-26 VITALS
DIASTOLIC BLOOD PRESSURE: 76 MMHG | WEIGHT: 191 LBS | TEMPERATURE: 98.1 F | SYSTOLIC BLOOD PRESSURE: 128 MMHG | BODY MASS INDEX: 28.95 KG/M2 | HEIGHT: 68 IN | HEART RATE: 66 BPM

## 2019-09-26 PROCEDURE — 99213 OFFICE O/P EST LOW 20 MIN: CPT

## 2019-09-26 RX ORDER — METHOCARBAMOL 500 MG/1
500 TABLET, FILM COATED ORAL 3 TIMES DAILY
Qty: 30 | Refills: 2 | Status: ACTIVE | COMMUNITY
Start: 2019-09-26 | End: 1900-01-01

## 2019-09-26 RX ORDER — GABAPENTIN 300 MG/1
300 CAPSULE ORAL 3 TIMES DAILY
Qty: 60 | Refills: 1 | Status: ACTIVE | COMMUNITY
Start: 2019-09-26 | End: 1900-01-01

## 2019-09-30 ENCOUNTER — RX RENEWAL (OUTPATIENT)
Age: 69
End: 2019-09-30

## 2019-09-30 RX ORDER — METHOCARBAMOL 750 MG/1
750 TABLET, FILM COATED ORAL 3 TIMES DAILY
Qty: 45 | Refills: 1 | Status: ACTIVE | COMMUNITY
Start: 2019-08-14 | End: 1900-01-01

## 2019-10-07 NOTE — REASON FOR VISIT
[Follow-Up: _____] : a [unfilled] follow-up visit [Spouse] : spouse [FreeTextEntry1] : Ms. Alegre presents for follow up visit.  She has discontinued the use of her LSO brace.  She reports mild to moderate lumbar region pain. 3/10. She reports 80-90% of right lower extremity radicular pain. She is ambulating independently.  She has some residual unsteadiness of gait. She is participating with physical therapy and tolerating well. Incision is well healed.  Denies any fever or chills. Denies any headaches or neck pain. She continues to manage her discomfort with gabapentin, methocarbamol and oxycodone as needed.  \par \par

## 2019-10-07 NOTE — ASSESSMENT
[FreeTextEntry1] : Ms. Alegre is doing well from the postoperative perspective.  I will see the patient back in 6 months to assess her continued progress.  She knows to call the office with any new or concerning symptoms in the interim.

## 2019-10-07 NOTE — PHYSICAL EXAM
[General Appearance - Alert] : alert [General Appearance - In No Acute Distress] : in no acute distress [General Appearance - Well Nourished] : well nourished [General Appearance - Well Developed] : well developed [General Appearance - Well-Appearing] : healthy appearing [] : normal voice and communication [Longitudinal] : longitudinal [Intact] : intact [Clean] : clean [Dry] : dry [No Drainage] : without drainage [Normal Skin] : normal [Normal Skin Turgor] : skin turgor was normal [Affect] : the affect was normal [Impaired Insight] : insight and judgment were intact [Oriented To Time, Place, And Person] : oriented to person, place, and time [Mood] : the mood was normal [Person] : oriented to person [Time] : oriented to time [Place] : oriented to place [Concentration Intact] : normal concentrating ability [Comprehension] : comprehension intact [Fluency] : fluency intact [Cranial Nerves Oculomotor (III)] : extraocular motion intact [Cranial Nerves Trigeminal (V)] : facial sensation intact symmetrically [Cranial Nerves Glossopharyngeal (IX)] : tongue and palate midline [Cranial Nerves Accessory (XI - Cranial And Spinal)] : head turning and shoulder shrug symmetric [Cranial Nerves Hypoglossal (XII)] : there was no tongue deviation with protrusion [Motor Tone] : muscle tone was normal in all four extremities [Sensation Tactile Decrease] : light touch was intact [Sensation Pain / Temperature Decrease] : pain and temperature was intact [Motor Strength] : muscle strength was normal in all four extremities [Abnormal Walk] : normal gait [Balance] : balance was intact [No Visual Abnormalities] : no visible abnormailities [Normal] : normal [Erythema] : not erythematous [Tender] : not tender [FreeTextEntry1] : lower back  [Over the Past 2 Weeks, Have You Felt Down, Depressed, or Hopeless?] : 1.) Over the past 2 weeks, have you felt down, depressed, or hopeless? No [Over the Past 2 Weeks, Have You Felt Little Interest or Pleasure Doing Things?] : 2.) Over the past 2 weeks, have you felt little interest or pleasure doing things? No [Cranial Nerves Optic (II)] : visual acuity intact bilaterally,  pupils equal round and reactive to light [FreeTextEntry6] : LE 5/5 bilaterally [Cranial Nerves Facial (VII)] : face symmetrical

## 2019-10-07 NOTE — REVIEW OF SYSTEMS
[Feeling Tired] : feeling tired [As Noted in HPI] : as noted in HPI [Numbness] : numbness [Tingling] : tingling [Difficulty Walking] : difficulty walking [Negative] : Endocrine [FreeTextEntry9] : lower back pain

## 2020-01-13 NOTE — CONSULT NOTE ADULT - CONSULT REASON
1. Have you been to the ER, urgent care clinic since your last visit? Hospitalized since your last visit? No    2. Have you seen or consulted any other health care providers outside of the [address] [place] [address] since your last visit? Include any pap smears or colon screening.  No s/p lami s/p lami/fusion

## 2022-05-24 NOTE — H&P PST ADULT - NS PRO FEM  PAP SMEARS 3YRS
Airway  Performed by: Estelle Corrales M.D.  Authorized by: Estelle Corrales M.D.     Location:  OR  Urgency:  Elective  Indications for Airway Management:  Anesthesia      Spontaneous Ventilation: absent    Sedation Level:  Deep  Preoxygenated: Yes    Patient Position:  Sniffing  Final Airway Type:  Endotracheal airway  Final Endotracheal Airway:  ETT  Cuffed: Yes    Technique Used for Successful ETT Placement:  Direct laryngoscopy    Insertion Site:  Oral  Blade Type:  Candido  Laryngoscope Blade/Videolaryngoscope Blade Size:  2  ETT Size (mm):  5.5  Measured from:  Teeth  ETT to Teeth (cm):  16  Placement Verified by: auscultation and capnometry    Cormack-Lehane Classification:  Grade IIa - partial view of glottis  Number of Attempts at Approach:  1           yes

## 2022-07-06 NOTE — PHYSICAL THERAPY INITIAL EVALUATION ADULT - GAIT TRAINING, PT EVAL
[FreeTextEntry1] : 86-yo female with h/o DM, HTN, hyperlipidemia. Cardiac w/u in 2021 showed normal LVEF, no evidence of ischemia.\par \par Patient recently experienced an episode of severe left-sided chest pain after drinking PO contrast for abdominal CT scan. She eventually had a bowel movement and felt gradual relief after that. She denies CP or SOB on ambulation.\par \par GFR 41\par LDL 80.
Time Frame:  1-2   days   Goal:  Modified Independent  with RW x  250 feet / Stairs: pt will navigate  2  stairs with  0  rail with CGA assist

## 2023-06-29 ENCOUNTER — APPOINTMENT (OUTPATIENT)
Dept: NEUROSURGERY | Facility: CLINIC | Age: 73
End: 2023-06-29
Payer: MEDICARE

## 2023-06-29 VITALS
SYSTOLIC BLOOD PRESSURE: 134 MMHG | WEIGHT: 212 LBS | DIASTOLIC BLOOD PRESSURE: 70 MMHG | BODY MASS INDEX: 36.19 KG/M2 | HEIGHT: 64 IN | HEART RATE: 63 BPM

## 2023-06-29 DIAGNOSIS — Z98.1 ARTHRODESIS STATUS: ICD-10-CM

## 2023-06-29 DIAGNOSIS — M48.061 SPINAL STENOSIS, LUMBAR REGION WITHOUT NEUROGENIC CLAUDICATION: ICD-10-CM

## 2023-06-29 PROCEDURE — 99204 OFFICE O/P NEW MOD 45 MIN: CPT

## 2023-06-29 NOTE — HISTORY OF PRESENT ILLNESS
[de-identified] : \meg Ochoa is a pleasant 73-year-old here accompanied by her  for evaluation of her lumbar spine.  Sounds like she had lumbar spinal surgery by Dr. Matthew many years ago and then had surgery with Dr. Galeas and Dr. Horne at [facility] in 2019.  What I can gather she had an L4-5 fusion way back then had some degree of pseudoarthrosis was offered revision and extension of fusion and now has an L2-L5 instrumented fusion construct.  She had rather severe back pain unfortunately has been doing very well with severe right-sided radicular complaints.  She is seeing pain management at this time and has not embarked on any treatments as of yet but wanted be seen by a surgeon as MRI showed small area of fluid collection and wanted that evaluated.

## 2023-06-29 NOTE — REASON FOR VISIT
[New Patient Visit] : a new patient visit [FreeTextEntry1] : Pt is here today with lumbar pain. Consult for a second opinion.

## 2023-06-29 NOTE — PLAN
[FreeTextEntry1] : \par This is a patient with lumbar stenosis who has had 2 surgeries secondary to an L3-5 instrumented fixation fusion.  MRI shows what appears to be a very small stable pseudomeningocele as there was a small incidental durotomy at that time does not appear to be present in any way.  Instrumentation is intact x-rays show no evidence of instability and adjacent likely segment above appears to be stable.\par \par I have not recommended any additional surgical invention at this time she can follow-up with her pain management team for all noninvasive hand minimally invasive procedures prescribed.

## 2023-06-29 NOTE — PHYSICAL EXAM
[Antalgic] : antalgic [Intact] : all motor groups within normal limits of strength and tone bilaterally

## 2023-06-29 NOTE — RESULTS/DATA
RN spoke with patient at length has about his history of severe claustrophobia and anxiety. 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 He said that he is unable to wear a mask due to the covid-19 pandemic. He said he can't even wear his c-pap mask for more than 30 minutes without panicking.  He said that his employer at the bar had him try on the face shield and he said he couldn't wear that either without panicking. RN discussed finding alternative options such as wearing a handkerchief at least it would be hanging down instead of wrapped around his face. With his employer having the plexi glass is not even an option.     He is requesting Dr. Brady to write a letter stating that patient is not medically able to wear a face mask due his anxiety and claustrophobia - since its already mandated  in [place] starting next week Monday and he said [facility] is voting on this tonight.    With Dr. Brady writing this letter- he is stating with out weighing the risks and benefits- it better for him to not wear the mask. RN said she would still send this to Dr. Brady to get his input.    Please advise. Thanks.          [FreeTextEntry1] : \par CAT scan imaging from 2019 in [facility] stream is well as x-rays are performed in the office today reviewed x-rays in the office reviewed personally by me instrumented fixation from L2-5.  There is a pedicle screw construct rods there are no broken screws.  The fusion mass is laterally in the facets CAT scan done immediately after surgery and Stream confirms that all the pedicle screws are in position without evidence of breach of significance.  The x-rays do not show any significant adjacent segment instability although there is some degree noted of adjacent segment degeneration on MRI as well as x-ray.\par \par MRI done in [place] imaging shows the decompressive laminectomy this still stenosis at the L4 551 levels there is an area of probably a small pseudomeningocele behind the laminectomy at L3-4 that was noted in the operative report is a incidental durotomy that was repaired.  The level above the adjacent level has mild stenosis.

## 2023-11-21 ENCOUNTER — APPOINTMENT (OUTPATIENT)
Age: 73
End: 2023-11-21

## 2024-05-07 ENCOUNTER — APPOINTMENT (OUTPATIENT)
Age: 74
End: 2024-05-07
Payer: MEDICARE

## 2024-05-07 PROCEDURE — 99213 OFFICE O/P EST LOW 20 MIN: CPT

## 2025-05-05 ENCOUNTER — OUTPATIENT (OUTPATIENT)
Dept: OUTPATIENT SERVICES | Facility: HOSPITAL | Age: 75
LOS: 1 days | End: 2025-05-05
Payer: MEDICARE

## 2025-05-05 VITALS
SYSTOLIC BLOOD PRESSURE: 122 MMHG | DIASTOLIC BLOOD PRESSURE: 67 MMHG | HEIGHT: 65 IN | HEART RATE: 59 BPM | RESPIRATION RATE: 20 BRPM | WEIGHT: 199.08 LBS | OXYGEN SATURATION: 100 % | TEMPERATURE: 99 F

## 2025-05-05 DIAGNOSIS — Z98.890 OTHER SPECIFIED POSTPROCEDURAL STATES: Chronic | ICD-10-CM

## 2025-05-05 DIAGNOSIS — Z90.49 ACQUIRED ABSENCE OF OTHER SPECIFIED PARTS OF DIGESTIVE TRACT: Chronic | ICD-10-CM

## 2025-05-05 DIAGNOSIS — N20.1 CALCULUS OF URETER: ICD-10-CM

## 2025-05-05 DIAGNOSIS — Z98.89 OTHER SPECIFIED POSTPROCEDURAL STATES: Chronic | ICD-10-CM

## 2025-05-05 DIAGNOSIS — Z01.818 ENCOUNTER FOR OTHER PREPROCEDURAL EXAMINATION: ICD-10-CM

## 2025-05-05 LAB
ANION GAP SERPL CALC-SCNC: 13 MMOL/L — SIGNIFICANT CHANGE UP (ref 5–17)
BUN SERPL-MCNC: 16 MG/DL — SIGNIFICANT CHANGE UP (ref 7–23)
CALCIUM SERPL-MCNC: 10 MG/DL — SIGNIFICANT CHANGE UP (ref 8.4–10.5)
CHLORIDE SERPL-SCNC: 107 MMOL/L — SIGNIFICANT CHANGE UP (ref 96–108)
CO2 SERPL-SCNC: 23 MMOL/L — SIGNIFICANT CHANGE UP (ref 22–31)
CREAT SERPL-MCNC: 1.31 MG/DL — HIGH (ref 0.5–1.3)
EGFR: 42 ML/MIN/1.73M2 — LOW
EGFR: 42 ML/MIN/1.73M2 — LOW
GLUCOSE SERPL-MCNC: 167 MG/DL — HIGH (ref 70–99)
HCT VFR BLD CALC: 43.3 % — SIGNIFICANT CHANGE UP (ref 34.5–45)
HGB BLD-MCNC: 14.3 G/DL — SIGNIFICANT CHANGE UP (ref 11.5–15.5)
MCHC RBC-ENTMCNC: 28.8 PG — SIGNIFICANT CHANGE UP (ref 27–34)
MCHC RBC-ENTMCNC: 33 G/DL — SIGNIFICANT CHANGE UP (ref 32–36)
MCV RBC AUTO: 87.1 FL — SIGNIFICANT CHANGE UP (ref 80–100)
NRBC BLD AUTO-RTO: 0 /100 WBCS — SIGNIFICANT CHANGE UP (ref 0–0)
PLATELET # BLD AUTO: 217 K/UL — SIGNIFICANT CHANGE UP (ref 150–400)
POTASSIUM SERPL-MCNC: 3.7 MMOL/L — SIGNIFICANT CHANGE UP (ref 3.5–5.3)
POTASSIUM SERPL-SCNC: 3.7 MMOL/L — SIGNIFICANT CHANGE UP (ref 3.5–5.3)
RBC # BLD: 4.97 M/UL — SIGNIFICANT CHANGE UP (ref 3.8–5.2)
RBC # FLD: 14.1 % — SIGNIFICANT CHANGE UP (ref 10.3–14.5)
SODIUM SERPL-SCNC: 143 MMOL/L — SIGNIFICANT CHANGE UP (ref 135–145)
WBC # BLD: 11.98 K/UL — HIGH (ref 3.8–10.5)
WBC # FLD AUTO: 11.98 K/UL — HIGH (ref 3.8–10.5)

## 2025-05-05 NOTE — H&P PST ADULT - ASSESSMENT
DASI score:  DASI activity:  Loose teeth or dentures:  Airway:  DASI score: 5.0 METS  DASI activity: ADLs, can walk indoors/outdoors with can/rolling walker, walks the dogs, can climb stairs slowly without CP or SOB  Loose teeth or dentures: denies   Airway: MP 3

## 2025-05-05 NOTE — H&P PST ADULT - HISTORY OF PRESENT ILLNESS
76 yo F with PMH of colon ca, HLD, spinal stenosis,  76 yo F with PMH of colon ca, s/p colon resection, HLD, anxiety/depression, spinal stenosis, hypothyroid, with left sided flank pain and hematuria beginning in March i/s/o ureteral stone.  Plan for cystoscopy, left ureteroscopy, stent insertion on 5/7/25 with Dr. Godoy.

## 2025-05-05 NOTE — H&P PST ADULT - SKIN
Patient (s) 1 given copy of dc instructions and 0 paper script(s) and 3 electronic scripts. Patient (s)  verbalized understanding of instructions and script (s). Patient given a current medication reconciliation form and verbalized understanding of their medications. Patient (s) verbalized understanding of the importance of discussing medications with  his or her physician or clinic they will be following up with. Patient alert and oriented and in no acute distress.
Patient presents to ED  with c/o acute on chronic wheezing, SOB, chest tightness, and yellow sputum productive cough x 2 days. Pt notes that she has been using her albuterol nebulizer treatments at home w/o relief. Patient is alert and oriented x 4 and in no acute distress at this time. Respirations are at a regular rate, depth, and pattern. Patient updated on plan of care and has no questions or concerns at this time. Call bell within reach. Will continue to monitor. Please reference nursing assessment. Emergency Department Nursing Plan of Care       The Nursing Plan of Care is developed from the Nursing assessment and Emergency Department Attending provider initial evaluation. The plan of care may be reviewed in the ED Provider note.     The Plan of Care was developed with the following considerations:   Patient / Family readiness to learn indicated by:verbalized understanding and successful return demonstration  Persons(s) to be included in education: patient  Barriers to Learning/Limitations:No    Signed     [id number] Ramos RN    12/21/2019  2:20 PM
warm and dry/color normal/no rashes

## 2025-05-05 NOTE — H&P PST ADULT - PROBLEM SELECTOR PLAN 1
Plan for cystoscopy, left ureteroscopy, stent insertion on 5/7/25 with Dr. Godoy.   PST labs sent (CBC, BMP, Ur Cx)  Pre procedure instructions discussed  Pre-op education provided - all questions answered

## 2025-05-05 NOTE — H&P PST ADULT - OPHTHALMOLOGIC
Phone Number Called: [phone]    Call outcome: Spoke to patient regarding message below.    Message: Patient states that she is in need of antibiotics for her tooth and would like to have a courtesy fill until she is able to see a dentist    negative

## 2025-05-05 NOTE — H&P PST ADULT - NSICDXPASTSURGICALHX_GEN_ALL_CORE_FT
PAST SURGICAL HISTORY:  S/P cholecystectomy     S/P colon resection     S/P lumbar fusion 2000's,  cervical discectomy 1980's,  rotator cuff repair 1980's,  ovarian cystectomy 1980's,  arthroscopy right knee x2, left once in the 2000's,  left lumpectomy 1980's (benign)    S/P tonsillectomy 1970's PAST SURGICAL HISTORY:  H/O cervical spine surgery     H/O foot surgery     S/P cholecystectomy     S/P colon resection     S/P lumbar fusion 2000's,  cervical discectomy 1980's,  rotator cuff repair 1980's,  ovarian cystectomy 1980's,  arthroscopy right knee x2, left once in the 2000's,  left lumpectomy 1980's (benign)    S/P tonsillectomy 1970's

## 2025-05-05 NOTE — H&P PST ADULT - NSICDXFAMILYHX_GEN_ALL_CORE_FT
FAMILY HISTORY:  Family history of acute myeloid leukemia  Family history of kidney cancer FAMILY HISTORY:  Family history of acute myeloid leukemia  Family history of kidney cancer

## 2025-05-05 NOTE — H&P PST ADULT - NSICDXPASTMEDICALHX_GEN_ALL_CORE_FT
PAST MEDICAL HISTORY:  Chronic pain     Colon cancer     GERD (gastroesophageal reflux disease)     Hyperlipidemia     Hypothyroid     IBS (irritable bowel syndrome)     Mariola-Cai tear repaired 5/14/15    Migraine     Raynaud disease PAST MEDICAL HISTORY:  Anxiety and depression     Calculus of ureter     Chronic pain     Colon cancer     GERD (gastroesophageal reflux disease)     H/O pemphigoid     H/O spinal stenosis     Hyperlipidemia     Hypothyroid     IBS (irritable bowel syndrome)     Mariola-Cai tear repaired 5/14/15    Migraine     Raynaud disease

## 2025-05-06 ENCOUNTER — APPOINTMENT (OUTPATIENT)
Age: 75
End: 2025-05-06

## 2025-05-06 LAB
CULTURE RESULTS: SIGNIFICANT CHANGE UP
SPECIMEN SOURCE: SIGNIFICANT CHANGE UP

## 2025-05-07 ENCOUNTER — TRANSCRIPTION ENCOUNTER (OUTPATIENT)
Age: 75
End: 2025-05-07

## 2025-05-07 ENCOUNTER — OUTPATIENT (OUTPATIENT)
Dept: INPATIENT UNIT | Facility: HOSPITAL | Age: 75
LOS: 1 days | End: 2025-05-07
Payer: MEDICARE

## 2025-05-07 VITALS
HEART RATE: 64 BPM | OXYGEN SATURATION: 100 % | SYSTOLIC BLOOD PRESSURE: 150 MMHG | DIASTOLIC BLOOD PRESSURE: 68 MMHG | TEMPERATURE: 98 F

## 2025-05-07 VITALS
TEMPERATURE: 98 F | DIASTOLIC BLOOD PRESSURE: 83 MMHG | OXYGEN SATURATION: 98 % | HEART RATE: 69 BPM | WEIGHT: 199.08 LBS | SYSTOLIC BLOOD PRESSURE: 129 MMHG | HEIGHT: 65 IN | RESPIRATION RATE: 17 BRPM

## 2025-05-07 DIAGNOSIS — N20.1 CALCULUS OF URETER: ICD-10-CM

## 2025-05-07 DIAGNOSIS — Z98.890 OTHER SPECIFIED POSTPROCEDURAL STATES: Chronic | ICD-10-CM

## 2025-05-07 DIAGNOSIS — Z90.49 ACQUIRED ABSENCE OF OTHER SPECIFIED PARTS OF DIGESTIVE TRACT: Chronic | ICD-10-CM

## 2025-05-07 DIAGNOSIS — Z98.89 OTHER SPECIFIED POSTPROCEDURAL STATES: Chronic | ICD-10-CM

## 2025-05-07 PROCEDURE — C1889: CPT

## 2025-05-07 PROCEDURE — C1769: CPT

## 2025-05-07 PROCEDURE — 88300 SURGICAL PATH GROSS: CPT | Mod: 26

## 2025-05-07 PROCEDURE — 82365 CALCULUS SPECTROSCOPY: CPT

## 2025-05-07 PROCEDURE — 87086 URINE CULTURE/COLONY COUNT: CPT

## 2025-05-07 PROCEDURE — 52356 CYSTO/URETERO W/LITHOTRIPSY: CPT | Mod: LT

## 2025-05-07 PROCEDURE — 80048 BASIC METABOLIC PNL TOTAL CA: CPT

## 2025-05-07 PROCEDURE — 85027 COMPLETE CBC AUTOMATED: CPT

## 2025-05-07 PROCEDURE — 76000 FLUOROSCOPY <1 HR PHYS/QHP: CPT

## 2025-05-07 PROCEDURE — G0463: CPT

## 2025-05-07 PROCEDURE — 88300 SURGICAL PATH GROSS: CPT

## 2025-05-07 PROCEDURE — C2617: CPT

## 2025-05-07 PROCEDURE — C1758: CPT

## 2025-05-07 DEVICE — GUIDEWIRE SENSOR DUAL-FLEX NITINOL STRAIGHT .035" X 150CM: Type: IMPLANTABLE DEVICE | Site: LEFT | Status: FUNCTIONAL

## 2025-05-07 DEVICE — STENT URET INLAY OPTIMA 6FRX22CM: Type: IMPLANTABLE DEVICE | Site: LEFT | Status: FUNCTIONAL

## 2025-05-07 DEVICE — URETERAL CATH OPEN END 5FR 70CM: Type: IMPLANTABLE DEVICE | Site: LEFT | Status: FUNCTIONAL

## 2025-05-07 DEVICE — LASER FIBER SOLTIVE 200 BALL TIP: Type: IMPLANTABLE DEVICE | Site: LEFT | Status: FUNCTIONAL

## 2025-05-07 DEVICE — STONE BASKET ZEROTIP NITINOL 4-WIRE 1.9FR 120CM X 12MM: Type: IMPLANTABLE DEVICE | Site: LEFT | Status: FUNCTIONAL

## 2025-05-07 RX ORDER — METOCLOPRAMIDE HCL 10 MG
10 TABLET ORAL ONCE
Refills: 0 | Status: DISCONTINUED | OUTPATIENT
Start: 2025-05-07 | End: 2025-05-07

## 2025-05-07 RX ORDER — FENTANYL CITRATE-0.9 % NACL/PF 100MCG/2ML
25 SYRINGE (ML) INTRAVENOUS
Refills: 0 | Status: DISCONTINUED | OUTPATIENT
Start: 2025-05-07 | End: 2025-05-07

## 2025-05-07 RX ORDER — FLUCONAZOLE 150 MG
1 TABLET ORAL
Refills: 0 | DISCHARGE

## 2025-05-07 RX ORDER — PHENAZOPYRIDINE HCL 100 MG
2 TABLET ORAL
Qty: 18 | Refills: 0
Start: 2025-05-07 | End: 2025-05-09

## 2025-05-07 RX ORDER — CEFAZOLIN SODIUM IN 0.9 % NACL 3 G/100 ML
2000 INTRAVENOUS SOLUTION, PIGGYBACK (ML) INTRAVENOUS ONCE
Refills: 0 | Status: COMPLETED | OUTPATIENT
Start: 2025-05-07 | End: 2025-05-07

## 2025-05-07 RX ORDER — PHENAZOPYRIDINE HCL 100 MG
100 TABLET ORAL ONCE
Refills: 0 | Status: COMPLETED | OUTPATIENT
Start: 2025-05-07 | End: 2025-05-07

## 2025-05-07 RX ORDER — FENTANYL CITRATE-0.9 % NACL/PF 100MCG/2ML
50 SYRINGE (ML) INTRAVENOUS
Refills: 0 | Status: DISCONTINUED | OUTPATIENT
Start: 2025-05-07 | End: 2025-05-07

## 2025-05-07 RX ORDER — DOCUSATE SODIUM 100 MG
1 CAPSULE ORAL
Refills: 0 | DISCHARGE

## 2025-05-07 RX ORDER — DOXYCYCLINE HYCLATE 100 MG
1 TABLET ORAL
Refills: 0 | DISCHARGE

## 2025-05-07 RX ORDER — CEFDINIR 250 MG/5ML
1 POWDER, FOR SUSPENSION ORAL
Qty: 6 | Refills: 0
Start: 2025-05-07 | End: 2025-05-09

## 2025-05-07 RX ORDER — PREDNISONE 20 MG/1
1 TABLET ORAL
Refills: 0 | DISCHARGE

## 2025-05-07 RX ORDER — CITALOPRAM 20 MG/1
1 TABLET ORAL
Refills: 0 | DISCHARGE

## 2025-05-07 RX ORDER — KETOROLAC TROMETHAMINE 30 MG/ML
15 INJECTION, SOLUTION INTRAMUSCULAR; INTRAVENOUS ONCE
Refills: 0 | Status: DISCONTINUED | OUTPATIENT
Start: 2025-05-07 | End: 2025-05-07

## 2025-05-07 RX ORDER — LIDOCAINE HCL/PF 10 MG/ML
0.2 VIAL (ML) INJECTION ONCE
Refills: 0 | Status: DISCONTINUED | OUTPATIENT
Start: 2025-05-07 | End: 2025-05-07

## 2025-05-07 RX ORDER — SODIUM CHLORIDE 9 G/1000ML
1000 INJECTION, SOLUTION INTRAVENOUS
Refills: 0 | Status: DISCONTINUED | OUTPATIENT
Start: 2025-05-07 | End: 2025-05-07

## 2025-05-07 RX ADMIN — KETOROLAC TROMETHAMINE 15 MILLIGRAM(S): 30 INJECTION, SOLUTION INTRAMUSCULAR; INTRAVENOUS at 14:00

## 2025-05-07 RX ADMIN — Medication 100 MILLIGRAM(S): at 13:35

## 2025-05-07 RX ADMIN — KETOROLAC TROMETHAMINE 15 MILLIGRAM(S): 30 INJECTION, SOLUTION INTRAMUSCULAR; INTRAVENOUS at 14:30

## 2025-05-07 NOTE — ASU DISCHARGE PLAN (ADULT/PEDIATRIC) - FINANCIAL ASSISTANCE
[facility] provides services at a reduced cost to those who are determined to be eligible through [facility]’s financial assistance program. Information regarding [facility]’s financial assistance program can be found by going to [email or web address] or by calling [phone].

## 2025-05-07 NOTE — ASU DISCHARGE PLAN (ADULT/PEDIATRIC) - ASU DC SPECIAL INSTRUCTIONSFT
Take tylenol as needed for pain control, pyridium as needed for burning on urination, and the antibiotic as prescribed.  You have a left ureteral stent that will be removed by Dr Godoy next week - call his office to make an appointment.  It is normal to see some blood in the urine and feel pain in the back upon urination.  Notify Dr Godoy's office if you develop any fevers of 100.8F+ or intractable pain/nausea/vomiting.

## 2025-05-07 NOTE — PRE-ANESTHESIA EVALUATION ADULT - NSANTHPMHFT_GEN_ALL_CORE
76 yo F with PMH of colon ca, s/p colon resection, HLD, anxiety/depression, spinal stenosis, hypothyroid, with left sided flank pain and hematuria beginning in March i/s/o ureteral stone.  Plan for cystoscopy, left ureteroscopy, stent insertion on 5/7/25 with Dr. Godoy.

## 2025-05-07 NOTE — ASU PATIENT PROFILE, ADULT - FALL HARM RISK - HARM RISK INTERVENTIONS

## 2025-05-07 NOTE — ASU PATIENT PROFILE, ADULT - NSICDXPASTSURGICALHX_GEN_ALL_CORE_FT
PAST SURGICAL HISTORY:  H/O cervical spine surgery     H/O foot surgery     S/P cholecystectomy     S/P colon resection     S/P lumbar fusion 2000's,  cervical discectomy 1980's,  rotator cuff repair 1980's,  ovarian cystectomy 1980's,  arthroscopy right knee x2, left once in the 2000's,  left lumpectomy 1980's (benign)    S/P tonsillectomy 1970's

## 2025-05-13 LAB
CELL MATERIAL STONE EST-MCNT: SIGNIFICANT CHANGE UP
LABORATORY COMMENT REPORT: SIGNIFICANT CHANGE UP
NIDUS STONE QN: SIGNIFICANT CHANGE UP

## 2025-05-16 LAB — SURGICAL PATHOLOGY STUDY: SIGNIFICANT CHANGE UP

## (undated) DEVICE — PREP BETADINE KIT

## (undated) DEVICE — PACK CYSTO

## (undated) DEVICE — VENODYNE/SCD SLEEVE CALF MEDIUM

## (undated) DEVICE — TUBING SUCTION 20FT

## (undated) DEVICE — ADAPTER CHECK FLO 9FR STERILE

## (undated) DEVICE — IRR BULB PATHFINDER + 10"

## (undated) DEVICE — PRESSURE INFUSOR BAG 3000ML

## (undated) DEVICE — GOWN TRIMAX LG

## (undated) DEVICE — SOL IRR POUR H2O 1500ML

## (undated) DEVICE — SYR ASEPTO

## (undated) DEVICE — DRAPE LINGEMAN TUR

## (undated) DEVICE — GLV 8 PROTEXIS (WHITE)

## (undated) DEVICE — SOL IRR BAG H2O 3000ML

## (undated) DEVICE — FOLEY HOLDER STATLOCK 2 WAY ADULT

## (undated) DEVICE — TUBING THERMADX UROLOGY

## (undated) DEVICE — ACMI SELF-SEALING SEAL UP TO 7FR

## (undated) DEVICE — WARMING BLANKET UPPER ADULT

## (undated) DEVICE — POSITIONER FOAM EGG CRATE ULNAR 2PCS (PINK)

## (undated) DEVICE — DRAPE EQUIPMENT BANDED BAG 30 X 30" (SHOWER CAP)

## (undated) DEVICE — POSITIONER FOAM HEADREST (PINK)